# Patient Record
Sex: FEMALE | Race: WHITE | Employment: OTHER | URBAN - METROPOLITAN AREA
[De-identification: names, ages, dates, MRNs, and addresses within clinical notes are randomized per-mention and may not be internally consistent; named-entity substitution may affect disease eponyms.]

---

## 2017-02-06 RX ORDER — MYCOPHENOLATE MOFETIL 500 MG/1
TABLET ORAL
Qty: 180 TAB | Refills: 3 | Status: SHIPPED | OUTPATIENT
Start: 2017-02-06 | End: 2017-05-17 | Stop reason: SDUPTHER

## 2017-04-04 ENCOUNTER — TELEPHONE (OUTPATIENT)
Dept: RHEUMATOLOGY | Age: 47
End: 2017-04-04

## 2017-04-04 NOTE — TELEPHONE ENCOUNTER
Rell Ordoñez approved by Leticia HARPER- # [id number] good 3-1-1159 until 10-4-2017, 6 months. [facility], and gave approval number to Ondina Gifford, and she will call the patient to arrange delivery. I will My Chart the patient also.

## 2017-04-05 DIAGNOSIS — K74.3 PRIMARY BILIARY CHOLANGITIS (HCC): Primary | ICD-10-CM

## 2017-04-05 DIAGNOSIS — K75.4 AUTOIMMUNE HEPATITIS (HCC): ICD-10-CM

## 2017-04-06 LAB
ALBUMIN SERPL-MCNC: 4.6 G/DL (ref 3.5–5.5)
ALBUMIN/GLOB SERPL: 2.1 {RATIO} (ref 1.2–2.2)
ALP SERPL-CCNC: 43 IU/L (ref 39–117)
ALT SERPL-CCNC: 6 IU/L (ref 0–32)
AST SERPL-CCNC: 11 IU/L (ref 0–40)
BILIRUB SERPL-MCNC: <0.2 MG/DL (ref 0–1.2)
BUN SERPL-MCNC: 13 MG/DL (ref 6–24)
BUN/CREAT SERPL: 19 (ref 9–23)
CALCIUM SERPL-MCNC: 9.4 MG/DL (ref 8.7–10.2)
CHLORIDE SERPL-SCNC: 102 MMOL/L (ref 96–106)
CO2 SERPL-SCNC: 22 MMOL/L (ref 18–29)
CREAT SERPL-MCNC: 0.67 MG/DL (ref 0.57–1)
ERYTHROCYTE [DISTWIDTH] IN BLOOD BY AUTOMATED COUNT: 13 % (ref 12.3–15.4)
GLOBULIN SER CALC-MCNC: 2.2 G/DL (ref 1.5–4.5)
GLUCOSE SERPL-MCNC: 97 MG/DL (ref 65–99)
HCT VFR BLD AUTO: 40.1 % (ref 34–46.6)
HGB BLD-MCNC: 13.4 G/DL (ref 11.1–15.9)
MCH RBC QN AUTO: 31.2 PG (ref 26.6–33)
MCHC RBC AUTO-ENTMCNC: 33.4 G/DL (ref 31.5–35.7)
MCV RBC AUTO: 93 FL (ref 79–97)
PLATELET # BLD AUTO: 267 X10E3/UL (ref 150–379)
POTASSIUM SERPL-SCNC: 4.5 MMOL/L (ref 3.5–5.2)
PROT SERPL-MCNC: 6.8 G/DL (ref 6–8.5)
RBC # BLD AUTO: 4.3 X10E6/UL (ref 3.77–5.28)
SODIUM SERPL-SCNC: 141 MMOL/L (ref 134–144)
WBC # BLD AUTO: 6.9 X10E3/UL (ref 3.4–10.8)

## 2017-04-10 ENCOUNTER — TELEPHONE (OUTPATIENT)
Dept: RHEUMATOLOGY | Age: 47
End: 2017-04-10

## 2017-04-10 NOTE — TELEPHONE ENCOUNTER
Patient informed Manisha Distad has been approved by Cara Mason, from 4/4/2017-10/4/2017, PA# [id number]. Patient states she has received Manisha Distad. Has been on Manisha Distad for 2 years.

## 2017-05-18 RX ORDER — MYCOPHENOLATE MOFETIL 500 MG/1
TABLET ORAL
Qty: 310 TAB | Refills: 0 | Status: SHIPPED | OUTPATIENT
Start: 2017-05-18 | End: 2018-01-15 | Stop reason: SDUPTHER

## 2017-05-24 ENCOUNTER — OFFICE VISIT (OUTPATIENT)
Dept: INTERNAL MEDICINE CLINIC | Age: 47
End: 2017-05-24

## 2017-05-24 VITALS
SYSTOLIC BLOOD PRESSURE: 120 MMHG | OXYGEN SATURATION: 97 % | TEMPERATURE: 98.2 F | BODY MASS INDEX: 26.97 KG/M2 | DIASTOLIC BLOOD PRESSURE: 86 MMHG | WEIGHT: 167.8 LBS | RESPIRATION RATE: 18 BRPM | HEART RATE: 82 BPM | HEIGHT: 66 IN

## 2017-05-24 DIAGNOSIS — R19.7 DIARRHEA, UNSPECIFIED TYPE: ICD-10-CM

## 2017-05-24 DIAGNOSIS — R53.83 FATIGUE, UNSPECIFIED TYPE: ICD-10-CM

## 2017-05-24 DIAGNOSIS — K75.4 AUTOIMMUNE HEPATITIS (HCC): ICD-10-CM

## 2017-05-24 DIAGNOSIS — L40.50 PSORIATIC ARTHRITIS (HCC): ICD-10-CM

## 2017-05-24 DIAGNOSIS — Z13.220 SCREENING FOR HYPERLIPIDEMIA: ICD-10-CM

## 2017-05-24 DIAGNOSIS — K74.3 PRIMARY BILIARY CHOLANGITIS (HCC): ICD-10-CM

## 2017-05-24 DIAGNOSIS — R14.0 ABDOMINAL BLOATING: Primary | ICD-10-CM

## 2017-05-24 DIAGNOSIS — E55.9 VITAMIN D DEFICIENCY: ICD-10-CM

## 2017-05-24 NOTE — MR AVS SNAPSHOT
Visit Information Date & Time Provider Department Dept. Phone Encounter #  
 5/24/2017  9:30 AM Heather Perez, [address] [place] [address],[address] [phone] [id number] Follow-up Instructions Return for follow up pending labs and annual.  .  
  
Your Appointments 5/25/2017  8:40 AM  
ESTABLISHED PATIENT with Silvestre Garg MD  
Arthritis and [address] ([facility]) Appt Note: routine f/u  
 [address] [facility] [address] [phone]  
  
   
 [address] [facility] [address] Upcoming Health Maintenance Date Due Pneumococcal 19-64 Medium Risk (1 of 1 - PPSV23) 7/23/1990 DTaP/Tdap/Td series (1 - Tdap) 7/23/1992 PAP AKA CERVICAL CYTOLOGY 10/1/2016 INFLUENZA AGE 9 TO ADULT 8/1/2017 Allergies as of 5/24/2017  Review Complete On: 5/24/2017 By: Heather Perez MD  
  
 Severity Noted Reaction Type Reactions Ciprofloxacin  12/22/2009    Itching Codeine  12/22/2009    Itching Demerol [Meperidine]  12/22/2009    Itching Sulfa (Sulfonamide Antibiotics)  12/22/2009    Itching Ultram [Tramadol]  03/07/2011    Itching Vicodin [Hydrocodone-acetaminophen]  12/22/2009    Itching Current Immunizations  Never Reviewed Name Date MMR Vaccine 3/10/2011 11:00 AM  
  
 Not reviewed this visit You Were Diagnosed With   
  
 Codes Comments Abdominal bloating    -  Primary ICD-10-CM: R14.0 ICD-9-CM: 787.3 Psoriatic arthritis ([facility]     ICD-10-CM: L40.50 ICD-9-CM: 696.0 Primary biliary cholangitis (HCC)     ICD-10-CM: K74.3 ICD-9-CM: 571.6 Autoimmune hepatitis ([facility]     ICD-10-CM: K75.4 ICD-9-CM: 571.42 Fatigue, unspecified type     ICD-10-CM: R53.83 ICD-9-CM: 780.79 Screening for hyperlipidemia     ICD-10-CM: Z13.220 ICD-9-CM: V77.91 Vitamin D deficiency     ICD-10-CM: E55.9 ICD-9-CM: 268.9 Diarrhea, unspecified type     ICD-10-CM: R19.7 ICD-9-CM: 787.91 Vitals BP Pulse Temp Resp Height(growth percentile) Weight(growth percentile) 120/86 82 98.2 °F (36.8 °C) (Oral) 18 5' 6\" (1.676 m) 167 lb 12.8 oz (76.1 kg) SpO2 BMI OB Status Smoking Status 97% 27.08 kg/m2 Injection Never Smoker Vitals History BMI and BSA Data Body Mass Index Body Surface Area 27.08 kg/m 2 1.88 m 2 Preferred Pharmacy Pharmacy Name Phone [id number] Tenzin HUGO 69 [phone] Your Updated Medication List  
  
   
This list is accurate as of: 5/24/17 10:22 AM.  Always use your most recent med list.  
  
  
  
  
 apremilast 30 mg Tab Commonly known as:  Lodema White Take 30 mg by mouth two (2) times a day. Cholecalciferol (Vitamin D3) 3,000 unit Tab Take 4,000 Units by mouth daily. magnesium oxide 500 mg Tab Take  by mouth.  
  
 medroxyPROGESTERone 150 mg/mL injection Commonly known as:  DEPO-PROVERA  
  
 mycophenolate 500 mg tablet Commonly known as:  CELLCEPT  
TAKE 1 TABLET BY MOUTH TWICE A DAY Omega-3-DHA-EPA-Fish Oil 1,000 mg (120 mg-180 mg) Cap Take  by mouth. ursodiol 300 mg capsule Commonly known as:  ACTIGALL TAKE 3 CAPSULES BY MOUTH DAILY  
  
 WOMEN'S MULTI PO Take  by mouth. Takes one po daily. We Performed the Following CELIAC ANTIBODY PROFILE [NLW30717 Custom] LIPID PANEL [92302 CPT(R)] REFERRAL TO LIVER HEPATOLOGY [[id number] Custom] Comments:  
 Please evaluate patient for followup on PBC and autoimmune hepatitis. REFERRAL TO RHEUMATOLOGY [SYZ61 Custom] Comments:  
 Please evaluate patient for followup on psoriatic arthritis T4, FREE [id number] CPT(R)] TSH 3RD GENERATION [89669 CPT(R)] VITAMIN D, 25 HYDROXY [id number] CPT(R)] Follow-up Instructions Return for follow up pending labs and annual.  . Referral Information Referral ID Referred By Referred To [id number] [address] [place] [address] Ruchi Grijalva MD   
   [address] [facility], [address] [place] [address] Phone: [phone] Fax: [phone] Visits Status Start Date End Date 1 New Request 5/24/17 5/24/18 If your referral has a status of pending review or denied, additional information will be sent to support the outcome of this decision. Referral ID Referred By Referred To  
 [id number] Flaco Cantu MD  
   [address] [place] [address] [address] [place], [facility] Phone: [phone] Fax: [phone] Visits Status Start Date End Date 1 New Request 5/24/17 5/24/18 If your referral has a status of pending review or denied, additional information will be sent to support the outcome of this decision. Patient Instructions FODMAPS diet. Reduce uncooked veggies, fats and dairy. Keep a food diary. Simethicone, Gas-X for bloating. Introducing [facility]! Dear Dory Santizo: Thank you for requesting a [employer] account. Our records indicate that you already have an active [employer] account. You can access your account anytime at [email or web address] Did you know that you can access your hospital and ER discharge instructions at any time in [employer]? You can also review all of your test results from your hospital stay or ER visit. Additional Information If you have questions, please visit the Frequently Asked Questions section of the [employer] website at [email or web address] Remember, [employer] is NOT to be used for urgent needs. For medical emergencies, dial 911. Now available from your iPhone and Android! Please provide this summary of care documentation to your next provider. Your primary care clinician is listed as Evangelina Pak. If you have any questions after today's visit, please call [phone].

## 2017-05-24 NOTE — PROGRESS NOTES
Chief Complaint   Patient presents with   [address]    Referral Request     Referral to Rheumatology     GI Problem     c/o Abdominal Pain x 2 weeks     1. Have you been to the ER, urgent care clinic since your last visit? Hospitalized since your last visit? No    2. Have you seen or consulted any other health care providers outside of the [address] [place] [address] since your last visit? Include any pap smears or colon screening.  Yes Where: Dr. Ting Del Toro, Rheumatology

## 2017-05-24 NOTE — LETTER
6/7/2017 10:02 AM 
 
Ms. Peewee Hurtado [address] [facility] [place] [address] Dear Peewee Hurtado: 
 
Please find your most recent results below. Resulted Orders LIPID PANEL Result Value Ref Range Cholesterol, total 210 (H) 100 - 199 mg/dL Comment:  
   Specimen received hemolyzed. Clinical correlation indicated. Triglyceride 146 0 - 149 mg/dL HDL Cholesterol 53 >39 mg/dL VLDL, calculated 29 5 - 40 mg/dL LDL, calculated 128 (H) 0 - 99 mg/dL Narrative Performed at:  [address]  [id number] : Daren Villa MD, Phone:  [phone] VITAMIN D, 25 HYDROXY Result Value Ref Range VITAMIN D, 25-HYDROXY 31.2 30.0 - 100.0 ng/mL Comment:  
   Vitamin D deficiency has been defined by the [address] [place] [address] practice guideline as a 
level of serum 25-OH vitamin D less than 20 ng/mL (1,2). The Endocrine Society went on to further define vitamin D 
insufficiency as a level between 21 and 29 ng/mL (2). 1. IOM ([place] of Medicine). 2010. Dietary reference 
   intakes for calcium and D. [facility]: The 
   [employer]. 2. Patience MF, Luis Fernando [place], Meño HURTADO, et al. 
   Evaluation, treatment, and prevention of vitamin D 
   deficiency: an Endocrine Society clinical practice 
   guideline. JCEM. 2011 Jul; 96(7):1911-30. Narrative Performed at:  [address]  [id number] : Daren Villa MD, Phone:  [phone] TSH 3RD GENERATION Result Value Ref Range TSH 1.860 0.450 - 4.500 uIU/mL Narrative Performed at:  [address]  [id number] : Daren Villa MD, Phone:  [phone] T4, FREE Result Value Ref Range T4, Free 1.27 0.82 - 1.77 ng/dL Narrative Performed at:  [place] [address] [address]  [id number] : Analy Alcala MD, Phone:  [phone] CELIAC ANTIBODY PROFILE Result Value Ref Range Immunoglobulin A, Qt. 177 87 - 352 mg/dL Deamidated Gliadin Ab, IgA 3 0 - 19 units Comment:  
                      Negative                   0 - 19 Weak Positive             20 - 30 Moderate to Strong Positive   >30 Deamidated Gliadin Ab, IgG 2 0 - 19 units Comment:  
                      Negative                   0 - 19 Weak Positive             20 - 30 Moderate to Strong Positive   >30 
  
 t-Transglutaminase, IgA <2 0 - 3 U/mL Comment:  
                                 Negative        0 -  3 Weak Positive   4 - 10 Positive           >10 Tissue Transglutaminase (tTG) has been identified 
 as the endomysial antigen. Studies have demonstr- 
 ated that endomysial IgA antibodies have over 99% 
 specificity for gluten sensitive enteropathy. t-Transglutaminase, IgG <2 0 - 5 U/mL Comment:  
                                 Negative        0 - 5 Weak Positive   6 - 9 Positive           >9 Narrative Performed at:  [address]  [id number] : Analy Alcala MD, Phone:  [phone] RECOMMENDATIONS: Negative test for celiac disease. Vitamin d normal. Cholesterol mildly elevated. Normal thyroid. Annual followup and as needed. Please call me if you have any questions: [phone] Sincerely, 
 
 
Valeriy Castillo MD

## 2017-05-24 NOTE — PATIENT INSTRUCTIONS
FODMAPS diet. Reduce uncooked veggies, fats and dairy. Keep a food diary. Simethicone, Gas-X for bloating.

## 2017-05-24 NOTE — PROGRESS NOTES
HPI: Kj Rehman is a 40 y.o. female presents to establish. She has a history of primary biliary cirrhosis, autoimmune hepatitis and psoriatic arthritis. Sees  [facility]. Saw Dr. Chanel Hudson, hepatology. Reports weight gain 10#,  Under more stress with work. Did not change eating habits with stress. Was walking regularly for 6 weeks and reduced due to work schedule. Daughter in , supportive partner. Denies anxious and depression. Feeling fatigued. normal CBC and COMP in April. Not sleeping as well as used to. Dog/kid waking her up, or to go to the bathroom. Diet is healthy. Reports abdominal bloating and discomfort in lower mid abdomen, 2 weeks. Diet high in veggies. Reports diarrhea since on Otezla for 2 years. No change in BM. Prior georgette. Sees gyn, prior abnormal pap. On depoprovera. No menses. Prior DEXA normal, 2 years ago. ROS:  Constitutional: negative for fevers, chills, anorexia, weight loss, positive for weight gain  Eyes:   negative for visual disturbance, irritation  ENT:   negative for tinnitus,sore throat,nasal congestion,ear pain,  Respiratory:  negative for cough, hemoptysis, dyspnea,wheezing  CV:   negative for chest pain, palpitations, lower extremity edema  GI:   negative for nausea, vomiting,  abdominal pain,melena,positive for diarrhea, abdominal bloating.    Endo:               negative for polyuria,polydipsia,polyphagia,heat intolerance  Genitourinary: negative for frequency, dysuria, hematuria  Integument:  negative for rash, pruritus  Hematologic:  negative for easy bruising and gum/nose bleeding  Musculoskel: negative for myalgias, back pain, muscle weakness, joint pain  Neurological:  negative for headaches, dizziness, gait problems, numbness  Behavl/Psych: negative for feelings of anxiety, depression, mood changes    Past Medical History:   Diagnosis Date    Abnormal Pap smear     h/o cryo    Autoimmune [facility] ([facility] 6/10/2012    Cholestasis of pregnancy     Elevated LFTs     Herpes simplex without mention of complication     not on Valtrex now (4/26/2012)    PBC (primary biliary cirrhosis)     [facility] ([facility]     Scalp psoriasis     Sicca syndrome, Sjogren's ([facility]      Past Surgical History:   Procedure Laterality Date    HX CHOLECYSTECTOMY      age 20's   [de-identified] OTHER SURGICAL      cholecystectomy     Social History     Social History    Marital status:      Spouse name: N/A    Number of children: 1    Years of education: N/A     Occupational History    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     Social History Main Topics    Smoking status: Never Smoker    Smokeless tobacco: Never Used    Alcohol use 1.8 oz/week     3 Glasses of wine per week    Drug use: No    Sexual activity: Yes     Partners: Male     Birth control/ protection: Injection     Other Topics Concern    None     Social History Narrative     Family History   Problem Relation Age of Onset    Thyroid Disease Father 54     hypothyroidism    High Cholesterol Mother     Cancer Paternal Grandmother      brca, age 63's    Heart Disease Maternal Grandmother     No Known Problems Brother     Suicide Maternal Grandfather     Heart Disease Brother      age 64    Cancer Paternal Aunt      pancreatic cancer     Current Outpatient Prescriptions   Medication Sig Dispense Refill    mycophenolate (CELLCEPT) 500 mg tablet TAKE 1 TABLET BY MOUTH TWICE A  Tab 0    apremilast (OTEZLA) 30 mg tab Take 30 mg by mouth two (2) times a day. 60 Tab 3    ursodiol (ACTIGALL) 300 mg capsule TAKE 3 CAPSULES BY MOUTH DAILY 90 Cap 5    medroxyPROGESTERone (DEPO-PROVERA) 150 mg/mL injection       magnesium oxide 500 mg tab Take  by mouth.  Omega-3-DHA-EPA-Fish Oil 1,000 (120-180) mg Cap Take  by mouth.  Cholecalciferol, Vitamin D3, 3,000 unit Tab Take 4,000 Units by mouth daily.       MV-MN/IRON FUM/FA/OMEGA3,6,9#3 [facility] MULTI PO) Take  by mouth. Takes one po daily. Allergies   Allergen Reactions    Ciprofloxacin Itching    Codeine Itching    Demerol [Meperidine] Itching    Sulfa (Sulfonamide Antibiotics) Itching    Ultram [Tramadol] Itching    Vicodin [Hydrocodone-Acetaminophen] Itching         Physical exam:  Visit Vitals    /86    Pulse 82    Temp 98.2 °F (36.8 °C) (Oral)    Resp 18    Ht 5' 6\" (1.676 m)    Wt 167 lb 12.8 oz (76.1 kg)    SpO2 97%    BMI 27.08 kg/m2     General appearance - alert, well appearing, and in no distress  HEENT- PERLL,normal conjunctiva, TM normal bilaterally,mucous membranes moist, pharynx normal without lesions  Neck - supple, no significant adenopathy   Pulm- clear to auscultation, no wheezes, rales or rhonchi  CV- normal rate, regular rhythm, normal S1, S2, no murmurs   Abdomen - soft, nontender, nondistended, no masses or organomegaly  Extrem-peripheral pulses normal, no pedal edema, no joint synovitis or deformity  Neuro -alert, oriented,nonfocal    Assessment/Plan:    1. Psoriatic arthritis ([facility]    - REFERRAL TO RHEUMATOLOGY    2. Primary biliary cholangitis ([facility]    - REFERRAL TO LIVER HEPATOLOGY    3. Autoimmune hepatitis ([facility]    - REFERRAL TO LIVER HEPATOLOGY    4. Fatigue, unspecified type- given information on fatigue. Checking baseline labs. Recommend regular exercise and adequate rest      - TSH 3RD GENERATION  - T4, FREE    5. Screening for hyperlipidemia    - LIPID PANEL    6. Vitamin D deficiency    - VITAMIN D, 25 HYDROXY    7. Abdominal bloating-given FODMAPS diet information and discussed dietary changes. Use simethicone prn.     - CELIAC ANTIBODY PROFILE    8.  Diarrhea, unspecified type-    - CELIAC ANTIBODY PROFILE    Follow-up Disposition:  Return for follow up pending labs and annual.  .    Roseann Crawford MD

## 2017-05-25 ENCOUNTER — OFFICE VISIT (OUTPATIENT)
Dept: RHEUMATOLOGY | Age: 47
End: 2017-05-25

## 2017-05-25 VITALS
WEIGHT: 167.4 LBS | HEART RATE: 76 BPM | BODY MASS INDEX: 26.9 KG/M2 | DIASTOLIC BLOOD PRESSURE: 88 MMHG | OXYGEN SATURATION: 99 % | TEMPERATURE: 99.1 F | SYSTOLIC BLOOD PRESSURE: 124 MMHG | RESPIRATION RATE: 18 BRPM | HEIGHT: 66 IN

## 2017-05-25 DIAGNOSIS — L40.50 PSA (PSORIATIC ARTHRITIS) (HCC): Primary | ICD-10-CM

## 2017-05-25 NOTE — MR AVS SNAPSHOT
Visit Information Date & Time Provider Department Dept. Phone Encounter #  
 5/25/2017  8:40 AM Ghazala Nayak MD [facility] Pending sale to [facility] [id number] Follow-up Instructions Return in about 4 months (around 9/25/2017). Upcoming Health Maintenance Date Due Pneumococcal 19-64 Medium Risk (1 of 1 - PPSV23) 7/23/1990 DTaP/Tdap/Td series (1 - Tdap) 7/23/1992 PAP AKA CERVICAL CYTOLOGY 10/1/2016 INFLUENZA AGE 9 TO ADULT 8/1/2017 Allergies as of 5/25/2017  Review Complete On: 5/25/2017 By: Chirag Castellanos LPN Severity Noted Reaction Type Reactions Ciprofloxacin  12/22/2009    Itching Codeine  12/22/2009    Itching Demerol [Meperidine]  12/22/2009    Itching Sulfa (Sulfonamide Antibiotics)  12/22/2009    Itching Ultram [Tramadol]  03/07/2011    Itching Vicodin [Hydrocodone-acetaminophen]  12/22/2009    Itching Current Immunizations  Never Reviewed Name Date MMR Vaccine 3/10/2011 11:00 AM  
  
 Not reviewed this visit Vitals BP Pulse Temp Resp Height(growth percentile) Weight(growth percentile) 124/88 (BP 1 Location: Right arm, BP Patient Position: Sitting) 76 99.1 °F (37.3 °C) (Oral) 18 5' 6\" (1.676 m) 167 lb 6.4 oz (75.9 kg) SpO2 BMI OB Status Smoking Status 99% 27.02 kg/m2 Injection Never Smoker BMI and BSA Data Body Mass Index Body Surface Area  
 27.02 kg/m 2 1.88 m 2 Preferred Pharmacy Pharmacy Name Phone [id number] [phone] Your Updated Medication List  
  
   
This list is accurate as of: 5/25/17  9:21 AM.  Always use your most recent med list.  
  
  
  
  
 apremilast 30 mg Tab Commonly known as:  Fay Boys Take 30 mg by mouth two (2) times a day. medroxyPROGESTERone 150 mg/mL injection Commonly known as:  DEPO-PROVERA mycophenolate 500 mg tablet Commonly known as:  CELLCEPT  
TAKE 1 TABLET BY MOUTH TWICE A DAY  
  
 ursodiol 300 mg capsule Commonly known as:  ACTIGALL TAKE 3 CAPSULES BY MOUTH DAILY Follow-up Instructions Return in about 4 months (around 9/25/2017). Introducing [facility]! Dear Pema Florez: Thank you for requesting a Glio account. Our records indicate that you already have an active Glio account. You can access your account anytime at [email or web address] Did you know that you can access your hospital and ER discharge instructions at any time in Glio? You can also review all of your test results from your hospital stay or ER visit. Additional Information If you have questions, please visit the Frequently Asked Questions section of the Glio website at [email or web address] Remember, Glio is NOT to be used for urgent needs. For medical emergencies, dial 911. Now available from your iPhone and Android! Please provide this summary of care documentation to your next provider. Your primary care clinician is listed as Sav Griffith. If you have any questions after today's visit, please call [phone].

## 2017-05-25 NOTE — PROGRESS NOTES
RHEUMATOLOGY PROBLEM LIST AND CHIEF COMPLAINT  1. Psoriatic arthritis - psoriasis, inflammatory arthralgia, fatigue, morning stiffness. Therapy History:  Prior DMARDs: Plaquenil (05/2016-11/2016)  Current DMARDs: Dallas Bracey (02/2015-current)    2. Sicca syndrome - paresthesia, dry mouth  3. Autoimmune hepatitis - previous prednisone for one year, now on CellCept  4. Primary biliary cirrhosis    INTERVAL HISTORY  This is a 40 y.o.  female. Today, the patient complains of no pain in the joints. Location: hand, hip, ankle, foot  Severity:  0 on a scale of 0-10  Timing: all day   Duration:  1 year  Modifying factors: Otezla  Context/Associated signs and symptoms: The patient has not been seen in almost 1 year. Today, she states that she is doing well. She admits to stopping after about 6 months because she did not notice any improvement. She complains that she is still having stomach issues from Dallas Chicho and that a zinc supplement did not help this. She states that she started a probiotic last week and thinks that this may be helping, but she does not want to stop Dallas Bracey because it has been helping with her PsA. She denies any psoriasis patches, but thinks that she may have be developing a rash on her scalp. She denies any arthralgia today, but states that some days she has pain and morning stiffness in the fingers, toes, ankles, and hips. She continues on Otezla 30 mg BID and Cellcept for her autoimmune hepatitis.      RHEUMATOLOGY REVIEW OF SYSTEMS   Positives as per history  Negatives as follows:  Michael Early:  Denies unexplained persistent fevers or weight change  RESPIRATORY:  No pleuritic pain, exertional dyspnea  CARDIOVASCULAR:  Denies chest pain  GASTRO:   Denies heartburn, abdominal pain, nausea, vomiting, diarrhea  SKIN:    Denies rash   MSK:    No morning stiffness >1 hour, persistent joint swelling    PAST MEDICAL HISTORY  Reviewed with patient, significant changes in medical history - no    PHYSICAL EXAM  Blood pressure 124/88, pulse 76, temperature 99.1 °F (37.3 °C), temperature source Oral, resp. rate 18, height 5' 6\" (1.676 m), weight 167 lb 6.4 oz (75.9 kg), SpO2 99 %. GENERAL APPEARANCE: Well-nourished, no acute distress  NECK: No adenopathy  ENT: No oral ulcers  CARDIOVASCULAR: Heart rhythm is regular. No murmur, rub, gallop  CHEST: Normal vesicular breath sounds. No wheezes, rales, pleural friction rubs  ABDOMINAL: The abdomen is soft and nontender. Bowel sounds are normal  SKIN: no psoriasis noted  MUSCULOSKELETAL: no SI joint pain. Negative Doron's bilaterally. Improvement in arthralgias with no tenderness over joints today  Upper extremities - full range of motion, no tenderness, no swelling, no synovial thickening and no deformity of joints  Lower extremities - full range of motion, no tenderness, no swelling, no synovial thickening and no deformity of joints     LABS, RADIOLOGY AND PROCEDURES   Previous labs reviewed -Yes    ASSESSMENT  1. Psoriatic arthritis (Established problem -  Stable disease) - the patient stopped plaquenil and has continued on Otezla 30 mg BID. Her disease appears stable, but she complained that she has occasional pain and morning stiffness. She also continues to have GI upset from her Inga Mustache, but she does not want to stop this since it helps with her psoriasis and joint symptoms. I discussed switching her to a biologic at some point in the future if her joint symptoms worsen on Otezla or if her GI symptoms become unbearable. If she decides that she wants to switch her medication, I will speak with Dr. Jeana Olvera prior to starting her on a new medication. She should continue on Otezla 30 mg BID and Cellcept as directed by hepatology. She should return in 4 months for a follow up. 2. Sicca syndrome - symptomatic treatment for dryness recommended. 3. Drug therapy monitoring for toxicity (cellcept) - CBC, BUN, Cr, AST, ALT and albumin every 3 months  4.  Drug therapy monitoring for toxicity (plaquenil ) - CBC, BUN, Cr, AST, ALT and albumin every 3 months; eye exams every 6-12 months for retinal toxicity. PLAN  1. Otezla 30 mg BID   2. Symptomatic treatment for sicca syndrome  3. CellCept for autoimmune hepatitis  4. Return in 4 months     Steven Rodrigues MD  Adult and Pediatric Rheumatology     [facility], [address] [facility], Phone [phone], Fax [phone]     Visiting  of Pediatrics    Department of Pediatrics, [facility] of [address] [place] [address], [address] [place] [address], Phone [phone], Fax [phone]    There are no Patient Instructions on file for this visit. cc:  MD Carleen Housermega    Written by pantera Paige, as dictated by Hira Ortiz.  Andrew Rodrigues M.D.

## 2017-05-26 LAB
25(OH)D3+25(OH)D2 SERPL-MCNC: 31.2 NG/ML (ref 30–100)
CHOLEST SERPL-MCNC: 210 MG/DL (ref 100–199)
GLIADIN PEPTIDE IGA SER-ACNC: 3 UNITS (ref 0–19)
GLIADIN PEPTIDE IGG SER-ACNC: 2 UNITS (ref 0–19)
HDLC SERPL-MCNC: 53 MG/DL
IGA SERPL-MCNC: 177 MG/DL (ref 87–352)
LDLC SERPL CALC-MCNC: 128 MG/DL (ref 0–99)
T4 FREE SERPL-MCNC: 1.27 NG/DL (ref 0.82–1.77)
TRIGL SERPL-MCNC: 146 MG/DL (ref 0–149)
TSH SERPL DL<=0.005 MIU/L-ACNC: 1.86 UIU/ML (ref 0.45–4.5)
TTG IGA SER-ACNC: <2 U/ML (ref 0–3)
TTG IGG SER-ACNC: <2 U/ML (ref 0–5)
VLDLC SERPL CALC-MCNC: 29 MG/DL (ref 5–40)

## 2017-06-06 NOTE — PROGRESS NOTES
Notify patient negative test for celiac disease. Vitamin d normal. Cholesterol mildly elevated. Normal thyroid. Annual followup and as needed.

## 2017-09-06 RX ORDER — APREMILAST 30 MG/1
TABLET, FILM COATED ORAL
Qty: 60 TAB | Refills: 2 | Status: SHIPPED | OUTPATIENT
Start: 2017-09-06 | End: 2017-09-07 | Stop reason: SDUPTHER

## 2017-09-12 RX ORDER — APREMILAST 30 MG/1
TABLET, FILM COATED ORAL
Qty: 60 TAB | Refills: 2 | Status: SHIPPED | OUTPATIENT
Start: 2017-09-12 | End: 2017-09-12 | Stop reason: SDUPTHER

## 2017-09-25 ENCOUNTER — OFFICE VISIT (OUTPATIENT)
Dept: RHEUMATOLOGY | Age: 47
End: 2017-09-25

## 2017-09-25 VITALS
TEMPERATURE: 99.1 F | OXYGEN SATURATION: 97 % | HEART RATE: 85 BPM | WEIGHT: 166.4 LBS | BODY MASS INDEX: 26.74 KG/M2 | HEIGHT: 66 IN | DIASTOLIC BLOOD PRESSURE: 87 MMHG | SYSTOLIC BLOOD PRESSURE: 122 MMHG

## 2017-09-25 DIAGNOSIS — K75.4 AUTOIMMUNE HEPATITIS (HCC): Primary | ICD-10-CM

## 2017-09-25 DIAGNOSIS — L40.50 PSA (PSORIATIC ARTHRITIS) (HCC): ICD-10-CM

## 2017-09-25 NOTE — MR AVS SNAPSHOT
Visit Information Date & Time Provider Department Dept. Phone Encounter #  
 9/25/2017 10:30 AM Dolly Mcburney, MD [address] [phone] [id number] Upcoming Health Maintenance Date Due Pneumococcal 19-64 Medium Risk (1 of 1 - PPSV23) 7/23/1990 DTaP/Tdap/Td series (1 - Tdap) 7/23/1992 PAP AKA CERVICAL CYTOLOGY 10/1/2016 INFLUENZA AGE 9 TO ADULT 8/1/2017 Allergies as of 9/25/2017  Review Complete On: 9/25/2017 By: Dolly Mcburney, MD  
  
 Severity Noted Reaction Type Reactions Ciprofloxacin  12/22/2009    Itching Codeine  12/22/2009    Itching Demerol [Meperidine]  12/22/2009    Itching Sulfa (Sulfonamide Antibiotics)  12/22/2009    Itching Ultram [Tramadol]  03/07/2011    Itching Vicodin [Hydrocodone-acetaminophen]  12/22/2009    Itching Current Immunizations  Never Reviewed Name Date MMR Vaccine 3/10/2011 11:00 AM  
  
 Not reviewed this visit You Were Diagnosed With   
  
 Codes Comments Autoimmune hepatitis ([facility]    -  Primary ICD-10-CM: K75.4 ICD-9-CM: 571.42   
 PSA (psoriatic arthritis) ([facility]     ICD-10-CM: L40.50 ICD-9-CM: 696.0 Vitals BP Pulse Temp Height(growth percentile) Weight(growth percentile) SpO2  
 122/87 (BP 1 Location: Left arm, BP Patient Position: Sitting) 85 99.1 °F (37.3 °C) (Oral) 5' 6\" (1.676 m) 166 lb 6.4 oz (75.5 kg) 97% BMI OB Status Smoking Status 26.86 kg/m2 Injection Never Smoker Vitals History BMI and BSA Data Body Mass Index Body Surface Area  
 26.86 kg/m 2 1.88 m 2 Preferred Pharmacy Pharmacy Name Phone [id number] [address] [address] [phone] Your Updated Medication List  
  
   
This list is accurate as of: 9/25/17 11:24 AM.  Always use your most recent med list.  
  
  
  
  
 apremilast 30 mg Tab Commonly known as:  David Lighter TAKE 30MG BY MOUTH TWO (2) TIMES A DAY  
  
 medroxyPROGESTERone 150 mg/mL injection Commonly known as:  DEPO-PROVERA  
  
 mycophenolate 500 mg tablet Commonly known as:  CELLCEPT  
TAKE 1 TABLET BY MOUTH TWICE A DAY  
  
 ursodiol 300 mg capsule Commonly known as:  ACTIGALL TAKE 3 CAPSULES BY MOUTH EVERY DAY We Performed the Following CBC+PLATELET+HEM REVIEW [18711 CPT(R)] HEP B SURFACE AG [id number] CPT(R)] HEPATITIS B CORE AB, TOTAL [id number] CPT(R)] HEPATITIS B SURF AB QUANT [id number] CPT(R)] METABOLIC PANEL, COMPREHENSIVE [35409 CPT(R)] QUANTIFERON TB GOLD [[id number] Custom] Introducing [facility] SERVICES! Dear Cheryl Son: Thank you for requesting a [email or web address] account. Our records indicate that you already have an active [email or web address] account. You can access your account anytime at [email or web address] Did you know that you can access your hospital and ER discharge instructions at any time in [email or web address]? You can also review all of your test results from your hospital stay or ER visit. Additional Information If you have questions, please visit the Frequently Asked Questions section of the [email or web address] website at [email or web address] Remember, [email or web address] is NOT to be used for urgent needs. For medical emergencies, dial 911. Now available from your iPhone and Android! Please provide this summary of care documentation to your next provider. Your primary care clinician is listed as Preeti Mcnally. If you have any questions after today's visit, please call [phone].

## 2017-09-25 NOTE — PROGRESS NOTES
Identified pt with two pt identifiers(name and ). Reviewed record in preparation for visit and have obtained necessary documentation. Chief Complaint   Patient presents with    Other     PSA        Health Maintenance Due   Topic    Pneumococcal 19-64 Medium Risk (1 of 1 - PPSV23)    DTaP/Tdap/Td series (1 - Tdap)    PAP AKA CERVICAL CYTOLOGY     INFLUENZA AGE 9 TO ADULT        Coordination of Care Questionnaire:  :   1) Have you been to an emergency room, urgent care clinic since your last visit? no   Hospitalized since your last visit? no             2. Have seen or consulted any other health care provider since your last visit? NO  If yes, where when, and reason for visit? 3) Do you have an Advanced Directive/ Living Will in place? YES  If yes, do we have a copy on file No, patient stated she will bring a copy on her next visit      Patient is accompanied by self.

## 2017-09-27 LAB
ALBUMIN SERPL-MCNC: 4.7 G/DL (ref 3.5–5.5)
ALBUMIN/GLOB SERPL: 2 {RATIO} (ref 1.2–2.2)
ALP SERPL-CCNC: 50 IU/L (ref 39–117)
ALT SERPL-CCNC: 10 IU/L (ref 0–32)
AST SERPL-CCNC: 12 IU/L (ref 0–40)
BASOPHILS # BLD MANUAL: 0.1 X10E3/UL (ref 0–0.2)
BASOPHILS NFR BLD MANUAL: 1 %
BILIRUB SERPL-MCNC: 0.4 MG/DL (ref 0–1.2)
BUN SERPL-MCNC: 14 MG/DL (ref 6–24)
BUN/CREAT SERPL: 21 (ref 9–23)
CALCIUM SERPL-MCNC: 9.4 MG/DL (ref 8.7–10.2)
CHLORIDE SERPL-SCNC: 102 MMOL/L (ref 96–106)
CO2 SERPL-SCNC: 23 MMOL/L (ref 18–29)
CREAT SERPL-MCNC: 0.66 MG/DL (ref 0.57–1)
DIFFERENTIAL COMMENT, 115260: NORMAL
EOSINOPHIL # BLD MANUAL: 0.1 X10E3/UL (ref 0–0.4)
EOSINOPHIL NFR BLD MANUAL: 1 %
ERYTHROCYTE [DISTWIDTH] IN BLOOD BY AUTOMATED COUNT: 13 % (ref 12.3–15.4)
GLOBULIN SER CALC-MCNC: 2.4 G/DL (ref 1.5–4.5)
GLUCOSE SERPL-MCNC: 91 MG/DL (ref 65–99)
HBV CORE AB SERPL QL IA: NEGATIVE
HBV SURFACE AB SER-ACNC: <3.1 MIU/ML
HBV SURFACE AG SERPL QL IA: NEGATIVE
HCT VFR BLD AUTO: 44 % (ref 34–46.6)
HGB BLD-MCNC: 14.4 G/DL (ref 11.1–15.9)
LYMPHOCYTES # BLD MANUAL: 2.6 X10E3/UL (ref 0.7–3.1)
LYMPHOCYTES NFR BLD MANUAL: 37 %
MCH RBC QN AUTO: 31.4 PG (ref 26.6–33)
MCHC RBC AUTO-ENTMCNC: 32.7 G/DL (ref 31.5–35.7)
MCV RBC AUTO: 96 FL (ref 79–97)
MONOCYTES # BLD MANUAL: 0.5 X10E3/UL (ref 0.1–0.9)
MONOCYTES NFR BLD MANUAL: 7 %
NEUTROPHILS # BLD MANUAL: 3.7 X10E3/UL (ref 1.4–7)
NEUTROPHILS NFR BLD MANUAL: 54 %
PLATELET # BLD AUTO: 309 X10E3/UL (ref 150–379)
PLATELET BLD QL SMEAR: ADEQUATE
POTASSIUM SERPL-SCNC: 4.1 MMOL/L (ref 3.5–5.2)
PROT SERPL-MCNC: 7.1 G/DL (ref 6–8.5)
RBC # BLD AUTO: 4.58 X10E6/UL (ref 3.77–5.28)
RBC MORPH BLD: NORMAL
SODIUM SERPL-SCNC: 138 MMOL/L (ref 134–144)
WBC # BLD AUTO: 6.9 X10E3/UL (ref 3.4–10.8)

## 2017-09-29 LAB
ANNOTATION COMMENT IMP: NORMAL
GAMMA INTERFERON BACKGROUND BLD IA-ACNC: 0.02 IU/ML
M TB IFN-G BLD-IMP: NEGATIVE
M TB IFN-G CD4+ BCKGRND COR BLD-ACNC: 0 IU/ML
M TB IFN-G CD4+ T-CELLS BLD-ACNC: 0.02 IU/ML
MITOGEN IGNF BLD-ACNC: >10 IU/ML
QUANTIFERON INCUBATION: NORMAL
SERVICE CMNT-IMP: NORMAL

## 2017-10-12 ENCOUNTER — TELEPHONE (OUTPATIENT)
Dept: INTERNAL MEDICINE CLINIC | Age: 47
End: 2017-10-12

## 2017-10-12 ENCOUNTER — TELEPHONE (OUTPATIENT)
Dept: HEMATOLOGY | Age: 47
End: 2017-10-12

## 2017-10-12 NOTE — TELEPHONE ENCOUNTER
Dr. Marcia Gilliam would like a return phone call regarding patient Jony Lara.   He would like to discuss a particular medication he would like to prescribe her for treatment but wants to check with you first.

## 2017-10-12 NOTE — TELEPHONE ENCOUNTER
#[phone]  Pt states she has had vertigo for over a week and would like to see Dr Koko Hinton.   Please call pt

## 2017-10-12 NOTE — TELEPHONE ENCOUNTER
Called and spoke to pt. Two pt identifiers confirmed. Scheduled pt for 10/13/17 at 0915 with Dr. Antony Bonilla. No further questions at time of call.

## 2017-10-13 ENCOUNTER — OFFICE VISIT (OUTPATIENT)
Dept: INTERNAL MEDICINE CLINIC | Age: 47
End: 2017-10-13

## 2017-10-13 VITALS
HEIGHT: 66 IN | OXYGEN SATURATION: 99 % | SYSTOLIC BLOOD PRESSURE: 105 MMHG | BODY MASS INDEX: 27.03 KG/M2 | DIASTOLIC BLOOD PRESSURE: 72 MMHG | TEMPERATURE: 98.2 F | HEART RATE: 80 BPM | WEIGHT: 168.2 LBS | RESPIRATION RATE: 18 BRPM

## 2017-10-13 DIAGNOSIS — H81.90 VESTIBULAR DIZZINESS: Primary | ICD-10-CM

## 2017-10-13 RX ORDER — MECLIZINE HYDROCHLORIDE 25 MG/1
TABLET ORAL
Qty: 30 TAB | Refills: 0 | Status: SHIPPED | OUTPATIENT
Start: 2017-10-13 | End: 2017-11-30 | Stop reason: ALTCHOICE

## 2017-10-13 NOTE — MR AVS SNAPSHOT
Visit Information Date & Time Provider Department Dept. Phone Encounter #  
 10/13/2017 11:15 AM Renetta Philippe, [address] [id number] Follow-up Instructions Return if symptoms worsen or fail to improve. Your Appointments 1/30/2018 10:00 AM  
ESTABLISHED PATIENT with Aylin Herr MD  
[address] ([facility]) Appt Note: 4 mo fu  
 [address] [facility] [address]  
[phone]  
  
   
 [address] [phone] Upcoming Health Maintenance Date Due Pneumococcal 19-64 Medium Risk (1 of 1 - PPSV23) 7/23/1990 DTaP/Tdap/Td series (1 - Tdap) 7/23/1992 PAP AKA CERVICAL CYTOLOGY 10/1/2016 INFLUENZA AGE 9 TO ADULT 8/1/2017 Allergies as of 10/13/2017  Review Complete On: 10/13/2017 By: Eli Pisano LPN Severity Noted Reaction Type Reactions Ciprofloxacin  12/22/2009    Itching Codeine  12/22/2009    Itching Demerol [Meperidine]  12/22/2009    Itching Sulfa (Sulfonamide Antibiotics)  12/22/2009    Itching Ultram [Tramadol]  03/07/2011    Itching Vicodin [Hydrocodone-acetaminophen]  12/22/2009    Itching Current Immunizations  Never Reviewed Name Date MMR Vaccine 3/10/2011 11:00 AM  
  
 Not reviewed this visit You Were Diagnosed With   
  
 Codes Comments Vestibular dizziness    -  Primary ICD-10-CM: K01 ICD-9-CM: 386. 9 Vitals BP Pulse Temp Resp Height(growth percentile) Weight(growth percentile) 105/72 (BP 1 Location: Left arm, BP Patient Position: Sitting) 80 98.2 °F (36.8 °C) (Oral) 18 5' 6\" (1.676 m) 168 lb 3.2 oz (76.3 kg) SpO2 BMI OB Status Smoking Status 99% 27.15 kg/m2 Injection Never Smoker BMI and BSA Data Body Mass Index Body Surface Area  
 27.15 kg/m 2 1.88 m 2 Preferred Pharmacy Pharmacy Name Phone [id number] - [address] [phone] Your Updated Medication List  
  
   
This list is accurate as of: 10/13/17 12:26 PM.  Always use your most recent med list.  
  
  
  
  
 apremilast 30 mg Tab Commonly known as:  Casselberry Ilsa TAKE 30MG BY MOUTH TWO (2) TIMES A DAY  
  
 meclizine 25 mg tablet Commonly known as:  ANTIVERT Take 1/2-1 tablet three times a day as needed for vertigo. medroxyPROGESTERone 150 mg/mL injection Commonly known as:  DEPO-PROVERA  
  
 mycophenolate 500 mg tablet Commonly known as:  CELLCEPT  
TAKE 1 TABLET BY MOUTH TWICE A DAY  
  
 ursodiol 300 mg capsule Commonly known as:  ACTIGALL TAKE 3 CAPSULES BY MOUTH EVERY DAY Prescriptions Sent to Pharmacy Refills  
 meclizine (ANTIVERT) 25 mg tablet 0 Sig: Take 1/2-1 tablet three times a day as needed for vertigo. Class: Normal  
 [facility] #: [phone] Follow-up Instructions Return if symptoms worsen or fail to improve. Patient Instructions Meclizine 25mg 1/2-1 tablet three times a day as needed for vertigo. flonase 2 sprays each nostril once a day. Cawthorne Exercises for Vertigo: Care Instructions Your Care Instructions Simple exercises can help you regain your balance when you have vertigo. If you have Ménière's disease, benign paroxysmal positional vertigo (BPPV), or another inner ear problem, you may have vertigo off and on. Do these exercises first thing in the morning and before you go to bed. You might get dizzy when you first start them. If this happens, try to do them for at least 5 minutes. Do a group of exercises at a time, starting at the top of the list. It may take several weeks before you can do all the exercises without feeling dizzy. Follow-up care is a key part of your treatment and safety. Be sure to make and go to all appointments, and call your doctor if you are having problems. It's also a good idea to know your test results and keep a list of the medicines you take. How can you care for yourself at home? Exercise 1 While sitting on the side of the bed and holding your head still: 
· Look up as far as you can. · Look down as far as you can. · Look from side to side as far as you can. · Stretch your arm straight out in front of you. Focus on your index finger. Continue to focus on your finger while you bring it to your nose. Exercise 2 While sitting on the side of the bed: · Bring your head as far back as you can. · Bring your head forward to touch your chin to your chest. 
· Turn your head from side to side. · Do these exercises first with your eyes open. Then try with your eyes closed. Exercise 3 While sitting on the side of the bed: · Shrug your shoulders straight upward, then relax them. · Bend over and try to touch the ground with your fingers. Then go back to a sitting position. · Toss a small ball from one hand to the other. Throw the ball higher than your eyes so you have to look up. Exercise 4 While standing (with someone close by if you feel uncomfortable): 
· Repeat Exercise 1. 
· Repeat Exercise 2. 
· Pass a ball between your legs and above your head. · Sit down and then stand up. Repeat. Turn around in a [place] a different way each time you stand. · With someone close by to help you, try the above exercises with your eyes closed. Exercise 5 In a room that is cleared of obstacles: 
· Walk to a corner of the room, turn to your right, and walk back to the starting point. Now, repeat and turn left. · Walk up and down a slope. Now try stairs. · While holding on to someone's arm, try these exercises with your eyes closed. When should you call for help? Watch closely for changes in your health, and be sure to contact your doctor if: 
· You do not get better as expected. Where can you learn more? Go to http://tiffanie-amara.info/. Enter E904 in the search box to learn more about \"Cawthorne Exercises for Vertigo: Care Instructions. \" Current as of: October 19, 2016 Content Version: 11.3 © 9328-7099 [employer]. Care instructions adapted under license by [employer] (which disclaims liability or warranty for this information). If you have questions about a medical condition or this instruction, always ask your healthcare professional. [address] any warranty or liability for your use of this information. Introducing [facility] SERVICES! Dear Gil Ribeiro: Thank you for requesting a DNS:Net account. Our records indicate that you already have an active DNS:Net account. You can access your account anytime at [email or web address] Did you know that you can access your hospital and ER discharge instructions at any time in DNS:Net? You can also review all of your test results from your hospital stay or ER visit. Additional Information If you have questions, please visit the Frequently Asked Questions section of the DNS:Net website at [email or web address] Remember, DNS:Net is NOT to be used for urgent needs. For medical emergencies, dial 911. Now available from your iPhone and Android! Please provide this summary of care documentation to your next provider. Your primary care clinician is listed as Francisco J Reardon. If you have any questions after today's visit, please call [phone].

## 2017-10-13 NOTE — PROGRESS NOTES
Chief Complaint   Patient presents with    Dizziness     x 9 days. Visit Vitals    /72 (BP 1 Location: Left arm, BP Patient Position: Sitting)    Pulse 80    Temp 98.2 °F (36.8 °C) (Oral)    Resp 18    Ht 5' 6\" (1.676 m)    Wt 168 lb 3.2 oz (76.3 kg)    SpO2 99%    BMI 27.15 kg/m2     Reviewed record In preparation for visit and have obtained necessary documentation    1. Have you been to the ER, urgent care clinic since your last visit? Hospitalized since your last visit? NO    2. Have you seen or consulted any other health care providers outside of the [address] [place] [address] since your last visit? Include any pap smears or colon screening. NO    Patient does not have advance directive on file .

## 2017-10-13 NOTE — PROGRESS NOTES
Gunner Palmer is a 56 y.o. female who presents with vertigo for 9 days. Prior vertigo only lasted for 1-2 days. This episode is getting worse over the past 2 days. Some ear popping. No ear pain. Some sinus headache. no discolored mucous. No nausea. No medications taken for vertigo. No speech changes. No focal weakness.           Past Medical History:   Diagnosis Date    Abnormal Pap smear     h/o cryo    Autoimmune hepatitis ([facility] 6/10/2012    Cholestasis of pregnancy     Elevated LFTs     Herpes simplex without mention of complication     not on Valtrex now (4/26/2012)    PBC (primary biliary cirrhosis)     Psoriatic arthritis ([facility]     Scalp psoriasis     Sicca syndrome, Sjogren's ([facility]        Family History   Problem Relation Age of Onset    Thyroid Disease Father 54     hypothyroidism    High Cholesterol Mother     Cancer Paternal Grandmother      brca, age 63's   Jean Lily Heart Disease Maternal Grandmother     No Known Problems Brother     Suicide Maternal Grandfather     Heart Disease Brother      age 64    Cancer Paternal Aunt      pancreatic cancer       Social History     Social History    Marital status:      Spouse name: N/A    Number of children: 1    Years of education: N/A     Occupational History    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     Social History Main Topics    Smoking status: Never Smoker    Smokeless tobacco: Never Used    Alcohol use 0.6 oz/week     1 Glasses of wine per week      Comment: Once a week    Drug use: No    Sexual activity: Yes     Partners: Male     Birth control/ protection: Injection     Other Topics Concern    Not on file     Social History Narrative       Current Outpatient Prescriptions on File Prior to Visit   Medication Sig Dispense Refill    ursodiol (ACTIGALL) 300 mg capsule TAKE 3 CAPSULES BY MOUTH EVERY DAY 90 Cap 0    apremilast (OTEZLA) 30 mg tab TAKE 30MG BY MOUTH TWO (2) TIMES A DAY 60 Tab 6    mycophenolate (CELLCEPT) 500 mg tablet TAKE 1 TABLET BY MOUTH TWICE A  Tab 0    medroxyPROGESTERone (DEPO-PROVERA) 150 mg/mL injection        No current facility-administered medications on file prior to visit. Review of Systems  Pertinent items are noted in HPI. Objective:     Visit Vitals    /72 (BP 1 Location: Left arm, BP Patient Position: Sitting)    Pulse 80    Temp 98.2 °F (36.8 °C) (Oral)    Resp 18    Ht 5' 6\" (1.676 m)    Wt 168 lb 3.2 oz (76.3 kg)    SpO2 99%    BMI 27.15 kg/m2     Gen: well appearing female  HEENT:   PERRL,normal conjunctiva. External ear and canals normal, TMs no opacification or erythema,  OP no erythema, no exudates, MMM  Neck:  Supple. Thyroid normal size, nontender, without nodules. No masses or LAD  Resp:  No wheezing, no rhonchi, no rales. CV:  RRR, normal S1S2, no murmur. GI: soft, nontender, without masses. No hepatosplenomegaly. Extrem:  +2 pulses, no edema, warm distally  Neuro: alert, nonfocal.       Assessment/Plan:       ICD-10-CM ICD-9-CM    1. Vestibular dizziness R42 386.9 meclizine (ANTIVERT) 25 mg tablet   Meclizine 25mg 1/2-1 tablet three times a day as needed for vertigo. flonase 2 sprays each nostril once a day. Given vertigo exercises. Follow-up Disposition:  Return if symptoms worsen or fail to improve.     Devan Hay MD

## 2017-10-18 RX ORDER — URSODIOL 300 MG/1
CAPSULE ORAL
Qty: 90 CAP | Refills: 0 | Status: SHIPPED | OUTPATIENT
Start: 2017-10-18 | End: 2017-12-11 | Stop reason: SDUPTHER

## 2017-10-25 ENCOUNTER — TELEPHONE (OUTPATIENT)
Dept: RHEUMATOLOGY | Age: 47
End: 2017-10-25

## 2017-10-25 NOTE — TELEPHONE ENCOUNTER
----- Message from Chaya Santa sent at 10/25/2017  8:44 AM EDT -----  Regarding: FW: /Telephone      ----- Message -----     From: Suzy Sr     Sent: 10/24/2017   5:45 PM       To: [facility]  Subject: /Telephone                               Pt needs prior authorization for [employer]. Best contact number is p) [phone] or (f) [phone].

## 2017-10-26 ENCOUNTER — OFFICE VISIT (OUTPATIENT)
Dept: HEMATOLOGY | Age: 47
End: 2017-10-26

## 2017-10-26 VITALS
BODY MASS INDEX: 26.63 KG/M2 | OXYGEN SATURATION: 97 % | HEART RATE: 86 BPM | WEIGHT: 165 LBS | TEMPERATURE: 96 F | DIASTOLIC BLOOD PRESSURE: 76 MMHG | SYSTOLIC BLOOD PRESSURE: 108 MMHG

## 2017-10-26 DIAGNOSIS — L40.50 PSORIATIC ARTHRITIS (HCC): ICD-10-CM

## 2017-10-26 DIAGNOSIS — K74.3 PRIMARY BILIARY CHOLANGITIS (HCC): ICD-10-CM

## 2017-10-26 DIAGNOSIS — K75.4 AUTOIMMUNE HEPATITIS (HCC): Primary | ICD-10-CM

## 2017-10-26 NOTE — PROGRESS NOTES
[address] MD, Judy Camarillo, [address] [place], [place]       WILNER Spencer PA-C Lindi Flank, [facility]   WILNER Perdue NP        at [address], [address] [address]     [phone]     FAX: [phone]    at [address], [id number] [place] [address], [address]     [phone]     FAX: [phone]     PCP: Finley Bosworth, MD    Patient Active Problem List   Diagnosis Code    Restless leg syndrome G25.81    S/P laparoscopic cholecystectomy Z90.49    Autoimmune hepatitis ([facility] K75.4    Primary biliary cholangitis ([facility] K74.3    Psoriatic arthritis ([facility] L40.50       Kiera Garrido returns to the [address] for monitoring and management of autoimmune hepatitis. The active problem list, all pertinent past medical history, medications, liver histology, radiologic findings and laboratory findings related to the liver disorder were reviewed with the patient. This is patient's first office appointment since March 2014. A liver biopsy was performed in 4/2012. This demonstrated severe inflammation and bridging fibrosis. Cellcept and prednisone was initiated and improved her liver enzymes. She has been completely weaned off of prednisone. An MRI and MRCP were performed in 6/2012 and 6/2013 which demonstrated ductal dilatation in the right lobe. SERA was added to see if this would improve her liver enzymes. She is currently being treated with Cellcept 500 mg bid and SERA with good toleration. Her liver transaminases have remained completely normal since SERA was added in 6/2013. She continues to have arthralgias and fatigue due to her psoriatic arthritis. She regularly sees rheumatology to manage these symptoms. Her medications are currently being adjusted to improve her symptoms.     Most recent blood work in September 2017 demonstrated normal liver enzymes and function. The patient completes all daily activities without any functional limitations. She continues to work part time. She denies abdominal pain, changes in bowel habits, dark urine, myalgias, arthralgias and pruritus. Allergies   Allergen Reactions    Ciprofloxacin Itching    Codeine Itching    Demerol [Meperidine] Itching    Sulfa (Sulfonamide Antibiotics) Itching    Ultram [Tramadol] Itching    Vicodin [Hydrocodone-Acetaminophen] Itching     Current Outpatient Prescriptions   Medication Sig    ursodiol (ACTIGALL) 300 mg capsule TAKE 3 CAPSULES BY MOUTH EVERY DAY    meclizine (ANTIVERT) 25 mg tablet Take 1/2-1 tablet three times a day as needed for vertigo.  apremilast (OTEZLA) 30 mg tab TAKE 30MG BY MOUTH TWO (2) TIMES A DAY    mycophenolate (CELLCEPT) 500 mg tablet TAKE 1 TABLET BY MOUTH TWICE A DAY    medroxyPROGESTERone (DEPO-PROVERA) 150 mg/mL injection      No current facility-administered medications for this visit. REVIEW OF SYSTEMS:  Systems related to all organ systems were reviewed. All were negative except as noted above. FAMILY/SOCIAL HISTORY:  The patient is . There is 1 child. The patient has never used tobacco products. The patient  consumes alcohol on social occasions rarely in excess. 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 PHYSICAL EXAMINATION:  Visit Vitals    /76    Pulse 86    Temp 96 °F (35.6 °C) (Oral)    Wt 165 lb (74.8 kg)    SpO2 97%    BMI 26.63 kg/m2     General: No acute distress. Eyes: Sclera anicteric. ENT: No oral lesions. Thyroid normal.  Nodes: No adenopathy. Skin: No spider angiomata. No jaundice. No palmar erythema. Respiratory: Lungs clear to auscultation. Cardiovascular: Regular heart rate. No murmurs. No JVD. Abdomen: Soft non-tender. Liver size normal to percussion/palpation. Spleen not palpable.  No obvious ascites. Extremities: No edema. No muscle wasting. No gross arthritic changes. Neurologic: Alert and oriented. Cranial nerves grossly intact. No asterixsis. LABORATORY STUDIES:  Liver [place] of [address] & Units 9/25/2017 4/5/2017 6/13/2016   WBC 3.4 - 10.8 x10E3/uL 6.9 6.9 7.8   ANC 1.4 - 7.0 X10E3/uL 3.7  4.8   HGB 11.1 - 15.9 g/dL 14.4 13.4 13.1    - 379 x10E3/uL 309 267 304   AST 0 - 40 IU/L 12 11 15   ALT 0 - 32 IU/L 10 6 12   Alk Phos 39 - 117 IU/L 50 43 51   Bili, Total 0.0 - 1.2 mg/dL 0.4 <0.2 0.4   Bili, Direct 0.00 - 0.40 mg/dL      Albumin 3.5 - 5.5 g/dL 4.7 4.6 4.6   BUN 6 - 24 mg/dL 14 13 13   Creat 0.57 - 1.00 mg/dL 0.66 0.67 0.68   Na 134 - 144 mmol/L 138 141 140   K 3.5 - 5.2 mmol/L 4.1 4.5 4.3   Cl 96 - 106 mmol/L 102 102 100   CO2 18 - 29 mmol/L 23 22 23   Glucose 65 - 99 mg/dL 91 97 117 (H)     SEROLOGIES:  Serologies Latest Ref Rn 3/29/2012 3/5/2012 12/8/2011   Hep A Ab, Total Negative Negative     Hep B Surface Ag Negative Negative     Hep B Core Ab, Total Negative Negative     Hep B Surface Ab 0.00 - 0.99 Index Value <0.1     Ferritin 13 - 150 ng/mL 95     Iron % Saturation 15 - 55 % 32     HEMAL Ab, Direct Negative   Negative   HEMAL, IFA  Negative     C-ANCA Neg:<1:20 titer <1:20     P-ANCA Neg:<1:20 titer <1:20     ANCA Neg:<1:20 titer <1:20     ASMCA 0 - 19 Units 4     M2 Ab 0.0 - 20.0 Units 4.8     Anti-SLA 0.0 - 20.0 units 3.4     LKM 0.0 - 20.0 Units 2.0     Ceruloplasmin 16.0 - 45.0 mg/dL  56.2 (H)    Alpha-1 antitrypsin level 90 - 200 mg/dL 195       LIVER HISTOLOGY:  5/2012. Reviewed by MLS. Severe active hepatitis with bile duct changes, proliferation and destruction. Bridging fibrosis. Histologically consistent with AIH-PBC overlap syndrome. ENDOSCOPIC PROCEDURES:  Not available or performed    RADIOLOGY:  9/2011. Ultrasound of liver. Normal appearing liver. No liver mass lesions. 6/2012. MRI abdomen. Normal appearing liver.   No liver mass lesions. No dilated bile ducts. No bile duct strictures. No ascites. 6/2013. MRI of abdomen. Several new small subcapsular areas of ductal dilatation with associated non-masslike parenchymal enhancement predominantly within the right hepatic lobe. No signs of fibrosis are demonstrated. No discrete mass lesions are noted. OTHER TESTING:  Not available or performed    ASSESSMENT AND PLAN:  Autoimmune hepatitis with severe inflammation and bridging fibrosis in 2012. Her liver transaminases remain normal with Cellcept daily. Directed patient to continue. She is very stable from a liver standpoint. PBC. Continue SERA daily. This normalized her liver enzymes. Fibroscan. Discussed reassessing her liver fibrosis with our in-office FibroScan. This is an alternative to the liver biopsy to see how her fibrosis has improved with being treated over the last 5 years. She is in agreement. This will be scheduled in the near future. Psoriatic arthritis. Rheumatology would like to switch her medication to Cosyntex or Orencia to better manage her symptoms. Either medication is safe from a liver standpoint as long as she is being monitored on a regular basis. Her HBV serologies were recently repeated appropriately. Results were negative. Follow-up [facility] [address] in 1 year. Dr. Pérez Naranjo was available during this visit.     Abigail Villasenor, WILNER  [address] [address], [address] [place] [address], [address] [place] [address]  Ph: [phone]  Fax: [phone]

## 2017-10-26 NOTE — MR AVS SNAPSHOT
Visit Information Date & Time Provider Department Dept. Phone Encounter #  
 10/26/2017 12:45 PM April LINDA Barahona, [address] [facility] [place]note 2] [id number] Your Appointments 1/30/2018 10:00 AM  
ESTABLISHED PATIENT with Chary Menendez MD  
[address] ([facility]) Appt Note: 4 mo fu  
 [facility] [address]  
[phone]  
  
   
 [address] [address]  
  
    
 10/16/2018 10:00 AM  
Follow Up with Tonya Morales NP [address] ([facility]) Appt Note: Follow up [address] [facility] Brian [phone] [facility] [address]  
[address] Upcoming Health Maintenance Date Due Pneumococcal 19-64 Medium Risk (1 of 1 - PPSV23) 7/23/1990 DTaP/Tdap/Td series (1 - Tdap) 7/23/1992 PAP AKA CERVICAL CYTOLOGY 10/1/2016 INFLUENZA AGE 9 TO ADULT 8/1/2017 Allergies as of 10/26/2017  Review Complete On: 10/26/2017 By: Tonya Morales NP Severity Noted Reaction Type Reactions Ciprofloxacin  12/22/2009    Itching Codeine  12/22/2009    Itching Demerol [Meperidine]  12/22/2009    Itching Sulfa (Sulfonamide Antibiotics)  12/22/2009    Itching Ultram [Tramadol]  03/07/2011    Itching Vicodin [Hydrocodone-acetaminophen]  12/22/2009    Itching Current Immunizations  Never Reviewed Name Date MMR Vaccine 3/10/2011 11:00 AM  
  
 Not reviewed this visit Vitals BP Pulse Temp Weight(growth percentile) SpO2 BMI  
 108/76 86 96 °F (35.6 °C) (Oral) 165 lb (74.8 kg) 97% 26.63 kg/m2 OB Status Smoking Status Injection Never Smoker BMI and BSA Data Body Mass Index Body Surface Area  
 26.63 kg/m 2 1.87 m 2 Preferred Pharmacy Pharmacy Name Phone  [id number] [address] [address], [address] [phone] Your Updated Medication List  
  
   
This list is accurate as of: 10/26/17  1:12 PM.  Always use your most recent med list.  
  
  
  
  
 apremilast 30 mg Tab Commonly known as:  Maximo Kanaris TAKE 30MG BY MOUTH TWO (2) TIMES A DAY  
  
 meclizine 25 mg tablet Commonly known as:  ANTIVERT Take 1/2-1 tablet three times a day as needed for vertigo. medroxyPROGESTERone 150 mg/mL injection Commonly known as:  DEPO-PROVERA  
  
 mycophenolate 500 mg tablet Commonly known as:  CELLCEPT  
TAKE 1 TABLET BY MOUTH TWICE A DAY  
  
 ursodiol 300 mg capsule Commonly known as:  ACTIGALL TAKE 3 CAPSULES BY MOUTH EVERY DAY To-Do List   
 11/30/2017 10:30 AM  
  Appointment with Tonya Díaz NP at [place] [address] ([phone]) [facility]! Dear Davina Morales: Thank you for requesting a [employer] account. Our records indicate that you already have an active [employer] account. You can access your account anytime at [email or web address] Live Chat. [email or web address] Live Chat Did you know that you can access your hospital and ER discharge instructions at any time in NextPrinciples? You can also review all of your test results from your hospital stay or ER visit. Additional Information If you have questions, please visit the Frequently Asked Questions section of the [employer] website at [email or web address] Live [email or web address]/Userlike Live Chat/. Remember, Next[employer]iples is NOT to be used for urgent needs. For medical emergencies, dial 911. Now available from your iPhone and Android! Please provide this summary of care documentation to your next provider. Your primary care clinician is listed as General Roblero. If you have any questions after today's visit, please call [phone].

## 2017-11-16 ENCOUNTER — TELEPHONE (OUTPATIENT)
Dept: RHEUMATOLOGY | Age: 47
End: 2017-11-16

## 2017-11-16 NOTE — TELEPHONE ENCOUNTER
Faxed prior auth request to Karthikeyan Weeks for Cosentyx for loading, and maintenance dosing for insurance approval.

## 2017-11-21 ENCOUNTER — TELEPHONE (OUTPATIENT)
Dept: RHEUMATOLOGY | Age: 47
End: 2017-11-21

## 2017-11-21 NOTE — TELEPHONE ENCOUNTER
Left message for patient received a fax from [facility] stating Cosentyx has been approved, and patient has zero copay, will get the Cosentyx 11/22/2017. Left instructions to call the office if she needs injection teaching.

## 2017-11-30 ENCOUNTER — OFFICE VISIT (OUTPATIENT)
Dept: HEMATOLOGY | Age: 47
End: 2017-11-30

## 2017-11-30 VITALS
SYSTOLIC BLOOD PRESSURE: 135 MMHG | OXYGEN SATURATION: 100 % | BODY MASS INDEX: 27.12 KG/M2 | DIASTOLIC BLOOD PRESSURE: 91 MMHG | HEART RATE: 86 BPM | WEIGHT: 168 LBS | TEMPERATURE: 98 F

## 2017-11-30 DIAGNOSIS — K75.4 AUTOIMMUNE HEPATITIS (HCC): Primary | ICD-10-CM

## 2017-11-30 DIAGNOSIS — K74.3 PRIMARY BILIARY CHOLANGITIS (HCC): ICD-10-CM

## 2017-11-30 NOTE — MR AVS SNAPSHOT
Visit Information Date & Time Provider Department Dept. Phone Encounter #  
 11/30/2017 10:30 AM April LINDA Rich, [address] [facility] 219 [id number] Your Appointments 1/30/2018 10:00 AM  
ESTABLISHED PATIENT with Cassie Quesada MD  
[address] ([address]) Appt Note: 4 mo fu  
 [facility] [address]  
[phone]  
  
   
 [address] [facility] [address]  
  
    
 10/16/2018 10:00 AM  
Follow Up with WILNER [address] ([address]) Appt Note: Follow up [address] [facility] [address] [phone] [facility] [address]  
[address] [facility] [address] Upcoming Health Maintenance Date Due Pneumococcal 19-64 Medium Risk (1 of 1 - PPSV23) 7/23/1990 DTaP/Tdap/Td series (1 - Tdap) 7/23/1992 PAP AKA CERVICAL CYTOLOGY 10/1/2016 Influenza Age 5 to Adult 8/1/2017 Allergies as of 11/30/2017  Review Complete On: 11/30/2017 By: Azam Prakash Severity Noted Reaction Type Reactions Ciprofloxacin  12/22/2009    Itching Codeine  12/22/2009    Itching Demerol [Meperidine]  12/22/2009    Itching Sulfa (Sulfonamide Antibiotics)  12/22/2009    Itching Ultram [Tramadol]  03/07/2011    Itching Vicodin [Hydrocodone-acetaminophen]  12/22/2009    Itching Current Immunizations  Never Reviewed Name Date MMR Vaccine 3/10/2011 11:00 AM  
  
 Not reviewed this visit You Were Diagnosed With   
  
 Codes Comments Autoimmune hepatitis ([facility] [address]    -  Primary ICD-10-CM: K75.4 ICD-9-CM: 571.42 Primary biliary cholangitis (HCC)     ICD-10-CM: K74.3 ICD-9-CM: 571.6 Vitals BP Pulse Temp Weight(growth percentile) SpO2 BMI  
 (!) 135/91 86 98 °F (36.7 °C) (Oral) 168 lb (76.2 kg) 100% 27.12 kg/m2 OB Status Smoking Status Injection Never Smoker BMI and BSA Data Body Mass Index Body Surface Area  
 27.12 kg/m 2 1.88 m 2 Preferred Pharmacy Pharmacy Name Phone [facility] [phone] Your Updated Medication List  
  
   
This list is accurate as of: 11/30/17 11:42 AM.  Always use your most recent med list.  
  
  
  
  
 apremilast 30 mg Tab Commonly known as:  [place] Showman TAKE 30MG BY MOUTH TWO (2) TIMES A DAY  
  
 COSENTYX SC  
by SubCUTAneous route. meclizine 25 mg tablet Commonly known as:  ANTIVERT Take 1/2-1 tablet three times a day as needed for vertigo. medroxyPROGESTERone 150 mg/mL injection Commonly known as:  DEPO-PROVERA  
  
 mycophenolate 500 mg tablet Commonly known as:  CELLCEPT  
TAKE 1 TABLET BY MOUTH TWICE A DAY  
  
 ursodiol 300 mg capsule Commonly known as:  ACTIGALL TAKE 3 CAPSULES BY MOUTH EVERY DAY We Performed the Following LIVER ELASTOGRAPHY W/O IMAG W/I&R [27183 CPT(R)] METABOLIC PANEL, COMPREHENSIVE [94819 CPT(R)] Introducing [facility] SERVICES! Dear Nasrin Teran: Thank you for requesting a Alt[employer] account. Our records indicate that you already have an active Alt[employer] account. You can access your account anytime at [email or web address].". SlideJar/"[employer] Did you know that you can access your hospital and ER discharge instructions at any time in AltspaceVR? You can also review all of your test results from your hospital stay or ER visit. Additional Information If you have questions, please visit the Frequently Asked Questions section of the Alt[employer] website at [email or web address]. SlideJar/"[employer]/. Remember, Altspace[employer] is NOT to be used for urgent needs. For medical emergencies, dial 911. Now available from your iPhone and Android! Please provide this summary of care documentation to your next provider. Your primary care clinician is listed as Chip Castaneda.  If you have any questions after today's visit, please call [phone].

## 2017-11-30 NOTE — PROGRESS NOTES
[address] MD [address], [address], [place], [place]       WILNER Wilson PA-C Suan Goes, VEENAP-BC   WILNER Bae NP        at [facility]     [address] [facility], [address] [address]     [phone]     FAX: [phone]    at [address], [address] [place] [address], [address]     [phone]     FAX: [phone]     PCP: Hitesh Wset MD    Patient Active Problem List   Diagnosis Code    Restless leg syndrome G25.81    S/P laparoscopic cholecystectomy Z90.49    Autoimmune hepatitis ([facility] [address] K75.4    Primary biliary cholangitis ([facility] [address] K74.3    Psoriatic arthritis ([facility] [address] L40.50       Bambi Ellison returns to the [address] for monitoring and management of autoimmune hepatitis. The active problem list, all pertinent past medical history, medications, liver histology, radiologic findings and laboratory findings related to the liver disorder were reviewed with the patient. A liver biopsy was performed in 4/2012. This demonstrated severe inflammation and bridging fibrosis. Cellcept and prednisone was initiated and improved her liver enzymes. She has been completely weaned off of prednisone. An MRI and MRCP were performed in 6/2012 and 6/2013 which demonstrated ductal dilatation in the right lobe. SERA was added to see if this would improve her liver enzymes. She is currently being treated with Cellcept 500 mg bid and SERA with good toleration. Her liver transaminases have remained completely normal since SERA was added in 6/2013. Patient presents to the clinic today for a Fibroscan to reassess liver fibrosis now that her AIH and PBC have been well controlled for 4 years. She continues to have arthralgias and fatigue due to her psoriatic arthritis. She regularly sees rheumatology to manage these symptoms.  Her medications are currently being adjusted to improve her symptoms. Most recent blood work in September 2017 demonstrated normal liver enzymes and function. The patient completes all daily activities without any functional limitations. She continues to work part time. She denies abdominal pain, changes in bowel habits, dark urine, myalgias, arthralgias and pruritus. ALLERGIES:  Allergies   Allergen Reactions    Ciprofloxacin Itching    Codeine Itching    Demerol [Meperidine] Itching    Sulfa (Sulfonamide Antibiotics) Itching    Ultram [Tramadol] Itching    Vicodin [Hydrocodone-Acetaminophen] Itching     MEDICATION:  Current Outpatient Prescriptions   Medication Sig    SECUKINUMAB (COSENTYX SC) by SubCUTAneous route.  ursodiol (ACTIGALL) 300 mg capsule TAKE 3 CAPSULES BY MOUTH EVERY DAY    mycophenolate (CELLCEPT) 500 mg tablet TAKE 1 TABLET BY MOUTH TWICE A DAY    medroxyPROGESTERone (DEPO-PROVERA) 150 mg/mL injection      No current facility-administered medications for this visit. REVIEW OF SYSTEMS:  Systems related to all organ systems were reviewed. All were negative except as noted above. FAMILY/SOCIAL HISTORY:  The patient is . There is 1 child. The patient has never used tobacco products. The patient  consumes alcohol on social occasions rarely in excess. 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 PHYSICAL EXAMINATION:  Visit Vitals    BP (!) 135/91    Pulse 86    Temp 98 °F (36.7 °C) (Oral)    Wt 168 lb (76.2 kg)    SpO2 100%    BMI 27.12 kg/m2     General: No acute distress. Eyes: Sclera anicteric. ENT: No oral lesions. Thyroid normal.  Nodes: No adenopathy. Skin: No spider angiomata. No jaundice. No palmar erythema. Respiratory: Lungs clear to auscultation. Cardiovascular: Regular heart rate. No murmurs. No JVD. Abdomen: Soft non-tender. Liver size normal to percussion/palpation. Spleen not palpable.  No obvious ascites. Extremities: No edema. No muscle wasting. No gross arthritic changes. Neurologic: Alert and oriented. Cranial nerves grossly intact. No asterixsis. LABORATORY STUDIES:  Liver [place] of [address] & Units 9/25/2017 4/5/2017 6/13/2016   WBC 3.4 - 10.8 x10E3/uL 6.9 6.9 7.8   ANC 1.4 - 7.0 X10E3/uL 3.7  4.8   HGB 11.1 - 15.9 g/dL 14.4 13.4 13.1    - 379 x10E3/uL 309 267 304   AST 0 - 40 IU/L 12 11 15   ALT 0 - 32 IU/L 10 6 12   Alk Phos 39 - 117 IU/L 50 43 51   Bili, Total 0.0 - 1.2 mg/dL 0.4 <0.2 0.4   Bili, Direct 0.00 - 0.40 mg/dL      Albumin 3.5 - 5.5 g/dL 4.7 4.6 4.6   BUN 6 - 24 mg/dL 14 13 13   Creat 0.57 - 1.00 mg/dL 0.66 0.67 0.68   Na 134 - 144 mmol/L 138 141 140   K 3.5 - 5.2 mmol/L 4.1 4.5 4.3   Cl 96 - 106 mmol/L 102 102 100   CO2 18 - 29 mmol/L 23 22 23   Glucose 65 - 99 mg/dL 91 97 117 (H)     SEROLOGIES:  Serologies Latest Ref Rn 3/29/2012 3/5/2012 12/8/2011   Hep A Ab, Total Negative Negative     Hep B Surface Ag Negative Negative     Hep B Core Ab, Total Negative Negative     Hep B Surface Ab 0.00 - 0.99 Index Value <0.1     Ferritin 13 - 150 ng/mL 95     Iron % Saturation 15 - 55 % 32     HEMAL Ab, Direct Negative   Negative   HEMAL, IFA  Negative     C-ANCA Neg:<1:20 titer <1:20     P-ANCA Neg:<1:20 titer <1:20     ANCA Neg:<1:20 titer <1:20     ASMCA 0 - 19 Units 4     M2 Ab 0.0 - 20.0 Units 4.8     Anti-SLA 0.0 - 20.0 units 3.4     LKM 0.0 - 20.0 Units 2.0     Ceruloplasmin 16.0 - 45.0 mg/dL  56.2 (H)    Alpha-1 antitrypsin level 90 - 200 mg/dL 195       LIVER HISTOLOGY:  5/2012. Reviewed by MLS. Severe active hepatitis with bile duct changes, proliferation and destruction. Bridging fibrosis. Histologically consistent with AIH-PBC overlap syndrome. 11/2017. FibroScan performed at [address]. EkPa was 3.3. Suggested fibrosis level is F0. ENDOSCOPIC PROCEDURES:  Not available or performed    RADIOLOGY:  9/2011. Ultrasound of liver.  Normal appearing liver. No liver mass lesions. 6/2012. MRI abdomen. Normal appearing liver. No liver mass lesions. No dilated bile ducts. No bile duct strictures. No ascites. 6/2013. MRI of abdomen. Several new small subcapsular areas of ductal dilatation with associated non-masslike parenchymal enhancement predominantly within the right hepatic lobe. No signs of fibrosis are demonstrated. No discrete mass lesions are noted. OTHER TESTING:  Not available or performed    ASSESSMENT AND PLAN:  Autoimmune hepatitis with severe inflammation and bridging fibrosis in 2012. Her liver transaminases remain normal with Cellcept daily. Directed patient to continue. She is very stable from a liver standpoint. PBC. Continue SERA daily. This normalized her liver enzymes. Fibrosis. FibroScan now demonstrates no fibrosis today after successful treatment of both AIH and PBC. Results were discussed in detail with patient. She is very stable from a liver standpoint. Psoriatic arthritis. Rheumatology switched her medication to Cosyntex 2 weeks ago to better manage her symptoms. It is very safe from a liver standpoint to take any medication needed for management of her psoriatic arthritis. She has tolerated this medication well so far. Follow-up Sumanth Castillo 32 in 1 year. Dr. Tori Morel was available during this visit.     Glenis Mayfield, WILNER  [address] [place] [address], [address] [place] [address], [address] [place] [address]  Ph: [phone]  Fax: [phone]

## 2018-01-15 RX ORDER — MYCOPHENOLATE MOFETIL 500 MG/1
TABLET ORAL
Qty: 310 TAB | Refills: 0 | Status: SHIPPED | OUTPATIENT
Start: 2018-01-15 | End: 2018-07-26 | Stop reason: SDUPTHER

## 2018-02-21 RX ORDER — URSODIOL 300 MG/1
CAPSULE ORAL
Qty: 90 CAP | Refills: 11 | Status: SHIPPED | OUTPATIENT
Start: 2018-02-21 | End: 2018-02-23 | Stop reason: SDUPTHER

## 2018-02-21 NOTE — TELEPHONE ENCOUNTER
Patient is requesting a 90 day supply of   Requested Prescriptions     Pending Prescriptions Disp Refills    ursodiol (ACTIGALL) 300 mg capsule 90 Cap 11

## 2018-02-23 RX ORDER — URSODIOL 300 MG/1
CAPSULE ORAL
Qty: 90 CAP | Refills: 11 | Status: SHIPPED | OUTPATIENT
Start: 2018-02-23 | End: 2018-07-26 | Stop reason: SDUPTHER

## 2018-02-23 NOTE — TELEPHONE ENCOUNTER
Requested Prescriptions     Pending Prescriptions Disp Refills    ursodiol (ACTIGALL) 300 mg capsule 90 Cap 11     Sig: TAKE 3 CAPSULES BY MOUTH EVERY DAY

## 2018-03-01 DIAGNOSIS — L40.50 PSA (PSORIATIC ARTHRITIS) (HCC): Primary | ICD-10-CM

## 2018-04-19 ENCOUNTER — TELEPHONE (OUTPATIENT)
Dept: RHEUMATOLOGY | Age: 48
End: 2018-04-19

## 2018-04-19 NOTE — TELEPHONE ENCOUNTER
Sent My Chart message to patient needs to have her labs done first before refill can be done on Cosentyx per Dr. Don Leyva.

## 2018-07-05 DIAGNOSIS — K74.3 PRIMARY BILIARY CHOLANGITIS (HCC): ICD-10-CM

## 2018-07-05 DIAGNOSIS — K75.4 AUTOIMMUNE HEPATITIS (HCC): Primary | ICD-10-CM

## 2018-07-11 LAB
ALBUMIN SERPL-MCNC: 4.8 G/DL (ref 3.5–5.5)
ALBUMIN/GLOB SERPL: 2 {RATIO} (ref 1.2–2.2)
ALP SERPL-CCNC: 44 IU/L (ref 39–117)
ALT SERPL-CCNC: 17 IU/L (ref 0–32)
AST SERPL-CCNC: 15 IU/L (ref 0–40)
BILIRUB SERPL-MCNC: 0.4 MG/DL (ref 0–1.2)
BUN SERPL-MCNC: 13 MG/DL (ref 6–24)
BUN/CREAT SERPL: 19 (ref 9–23)
CALCIUM SERPL-MCNC: 9.4 MG/DL (ref 8.7–10.2)
CHLORIDE SERPL-SCNC: 100 MMOL/L (ref 96–106)
CO2 SERPL-SCNC: 23 MMOL/L (ref 20–29)
CREAT SERPL-MCNC: 0.7 MG/DL (ref 0.57–1)
ERYTHROCYTE [DISTWIDTH] IN BLOOD BY AUTOMATED COUNT: 12.9 % (ref 12.3–15.4)
GLOBULIN SER CALC-MCNC: 2.4 G/DL (ref 1.5–4.5)
GLUCOSE SERPL-MCNC: 139 MG/DL (ref 65–99)
HCT VFR BLD AUTO: 42.6 % (ref 34–46.6)
HGB BLD-MCNC: 14 G/DL (ref 11.1–15.9)
MCH RBC QN AUTO: 31.7 PG (ref 26.6–33)
MCHC RBC AUTO-ENTMCNC: 32.9 G/DL (ref 31.5–35.7)
MCV RBC AUTO: 97 FL (ref 79–97)
PLATELET # BLD AUTO: 269 X10E3/UL (ref 150–379)
POTASSIUM SERPL-SCNC: 4.2 MMOL/L (ref 3.5–5.2)
PROT SERPL-MCNC: 7.2 G/DL (ref 6–8.5)
RBC # BLD AUTO: 4.41 X10E6/UL (ref 3.77–5.28)
SODIUM SERPL-SCNC: 138 MMOL/L (ref 134–144)
WBC # BLD AUTO: 6.9 X10E3/UL (ref 3.4–10.8)

## 2018-07-26 DIAGNOSIS — K75.4 AUTOIMMUNE HEPATITIS (HCC): Primary | ICD-10-CM

## 2018-07-26 RX ORDER — URSODIOL 300 MG/1
CAPSULE ORAL
Qty: 90 CAP | Refills: 11 | Status: SHIPPED | COMMUNITY
Start: 2018-07-26 | End: 2019-08-21 | Stop reason: SDUPTHER

## 2018-07-26 NOTE — TELEPHONE ENCOUNTER
The pt recently changed mail order pharmacy. She will now be using [facility]. Please send attached scripts as soon as possible.    Requested Prescriptions     Pending Prescriptions Disp Refills    mycophenolate (CELLCEPT) 500 mg tablet 310 Tab 0    ursodiol (ACTIGALL) 300 mg capsule 90 Cap 11     Sig: TAKE 3 CAPSULES BY MOUTH EVERY DAY

## 2018-07-27 RX ORDER — MYCOPHENOLATE MOFETIL 500 MG/1
TABLET ORAL
Qty: 310 TAB | Refills: 0 | Status: SHIPPED | OUTPATIENT
Start: 2018-07-27 | End: 2018-07-30 | Stop reason: SDUPTHER

## 2018-07-30 DIAGNOSIS — K75.4 AUTOIMMUNE HEPATITIS (HCC): ICD-10-CM

## 2018-07-30 RX ORDER — MYCOPHENOLATE MOFETIL 500 MG/1
TABLET ORAL
Qty: 310 TAB | Refills: 0 | Status: SHIPPED | OUTPATIENT
Start: 2018-07-30 | End: 2018-09-21 | Stop reason: SDUPTHER

## 2018-09-21 RX ORDER — MYCOPHENOLATE MOFETIL 500 MG/1
TABLET ORAL
Qty: 310 TAB | Refills: 0 | Status: SHIPPED | OUTPATIENT
Start: 2018-09-21 | End: 2018-09-24 | Stop reason: SDUPTHER

## 2018-09-24 RX ORDER — MYCOPHENOLATE MOFETIL 500 MG/1
TABLET ORAL
Qty: 180 TAB | Refills: 3 | Status: SHIPPED | OUTPATIENT
Start: 2018-09-24 | End: 2019-04-16

## 2018-10-16 ENCOUNTER — OFFICE VISIT (OUTPATIENT)
Dept: HEMATOLOGY | Age: 48
End: 2018-10-16

## 2018-10-16 VITALS
HEART RATE: 83 BPM | WEIGHT: 170.2 LBS | HEIGHT: 66 IN | BODY MASS INDEX: 27.35 KG/M2 | DIASTOLIC BLOOD PRESSURE: 81 MMHG | SYSTOLIC BLOOD PRESSURE: 123 MMHG | TEMPERATURE: 99 F | OXYGEN SATURATION: 97 %

## 2018-10-16 DIAGNOSIS — K74.3 PRIMARY BILIARY CHOLANGITIS (HCC): Primary | ICD-10-CM

## 2018-10-16 DIAGNOSIS — K75.4 AUTOIMMUNE HEPATITIS (HCC): ICD-10-CM

## 2018-10-16 NOTE — PROGRESS NOTES
[address] Yessi Hebert MD, [address], [place], [place]       WILNER Adkins PA-C Romelle Hazy, [facility]   WILNER Lopez NP        at [facility]     [address] [facility], [address], [address] [facility], [address]     [phone]     FAX: [phone]    at [address], [place] [address]., [address]     [phone]     FAX: [phone]     PCP: Suzanne Randhawa MD    Patient Active Problem List   Diagnosis Code    Restless leg syndrome G25.81    S/P laparoscopic cholecystectomy Z90.49    Autoimmune hepatitis ([facility] [address] K75.4    Primary biliary cholangitis ([facility] [address] K74.3    Psoriatic arthritis ([facility] [address] L40.50       Bina Citizen [facility] for monitoring and management of autoimmune hepatitis. The active problem list, all pertinent past medical history, medications, liver histology, radiologic findings and laboratory findings related to the liver disorder were reviewed with the patient. A liver biopsy was performed in 4/2012. This demonstrated severe inflammation and bridging fibrosis. Cellcept and prednisone was initiated and improved her liver enzymes. She has been completely weaned off of prednisone. An MRI and MRCP were performed in 6/2012 and 6/2013 which demonstrated ductal dilatation in the right lobe. SERA was added to see if this would improve her liver enzymes. She is currently being treated with Cellcept 500 mg bid and SERA with good toleration. Her liver transaminases have remained completely normal since SERA was added in 6/2013. Fibroscan was performed in November 2017 to reassess liver fibrosis after AIH and PBC were well controlled for 4 years. This demonstrated no fibrosis. She continues to have arthralgias and fatigue due to her psoriatic arthritis. She regularly sees rheumatology to manage these symptoms.  Her medications are currently being adjusted to improve her symptoms. Most recent blood work in July 2018 demonstrated normal liver enzymes and function. The patient completes all daily activities without any functional limitations. She continues to work full-time. She denies abdominal pain, changes in bowel habits, dark urine, myalgias, arthralgias and pruritus. ALLERGIES:  Allergies   Allergen Reactions    Ciprofloxacin Itching    Codeine Itching    Demerol [Meperidine] Itching    Sulfa (Sulfonamide Antibiotics) Itching    Ultram [Tramadol] Itching    Vicodin [Hydrocodone-Acetaminophen] Itching     MEDICATION:  Current Outpatient Prescriptions   Medication Sig    mycophenolate (CELLCEPT) 500 mg tablet TAKE 1 TABLET BY MOUTH TWICE A DAY    ursodiol (ACTIGALL) 300 mg capsule TAKE 3 CAPSULES BY MOUTH EVERY DAY    medroxyPROGESTERone (DEPO-PROVERA) 150 mg/mL injection      No current facility-administered medications for this visit. SYSTEM REVIEW NOT RELATED TO LIVER DISEASE OR REVIEWED ABOVE:  Constitution systems: Negative for fever, chills, weight gain, weight loss. Eyes: Negative for visual changes. ENT: Negative for sore throat, painful swallowing. Respiratory: Negative for cough, hemoptysis, SOB. Cardiology: Negative for chest pain, palpitations. GI:  Negative for constipation or diarrhea. : Negative for urinary frequency, dysuria, hematuria, nocturia. Skin: Negative for rash. Hematology: Negative for easy bruising, blood clots. Musculo-skelatal: Negative for back pain, muscle pain, weakness. Neurologic: Negative for headaches, dizziness, vertigo, memory problems not related to HE. Psychology: Negative for anxiety, depression. FAMILY/SOCIAL HISTORY:  The patient is . There is 1 child. The patient has never used tobacco products. The patient  consumes alcohol on social occasions rarely in excess.     The patient currently works full-time as consultant for small businesses. PHYSICAL EXAMINATION:  Visit Vitals    /81 (BP 1 Location: Left arm, BP Patient Position: Sitting)    Pulse 83    Temp 99 °F (37.2 °C) (Tympanic)    Ht 5' 6\" (1.676 m)    Wt 170 lb 3.2 oz (77.2 kg)    SpO2 97%    BMI 27.47 kg/m2     General: No acute distress. Eyes: Sclera anicteric. ENT: No oral lesions. Thyroid normal.  Nodes: No adenopathy. Skin: No spider angiomata. No jaundice. No palmar erythema. Respiratory: Lungs clear to auscultation. Cardiovascular: Regular heart rate. No murmurs. No JVD. Abdomen: Soft non-tender. Liver size normal to percussion/palpation. Spleen not palpable. No obvious ascites. Extremities: No edema. No muscle wasting. No gross arthritic changes. Neurologic: Alert and oriented. Cranial nerves grossly intact. No asterixsis.     LABORATORY STUDIES:  Liver [place] of [address] [place] [address] & Units 7/10/2018 9/25/2017 4/5/2017   WBC 3.4 - 10.8 x10E3/uL 6.9 6.9 6.9   ANC 1.4 - 7.0 X10E3/uL  3.7    HGB 11.1 - 15.9 g/dL 14.0 14.4 13.4    - 379 x10E3/uL 269 309 267   AST 0 - 40 IU/L 15 12 11   ALT 0 - 32 IU/L 17 10 6   Alk Phos 39 - 117 IU/L 44 50 43   Bili, Total 0.0 - 1.2 mg/dL 0.4 0.4 <0.2   Albumin 3.5 - 5.5 g/dL 4.8 4.7 4.6   BUN 6 - 24 mg/dL 13 14 13   Creat 0.57 - 1.00 mg/dL 0.70 0.66 0.67   Na 134 - 144 mmol/L 138 138 141   K 3.5 - 5.2 mmol/L 4.2 4.1 4.5   Cl 96 - 106 mmol/L 100 102 102   CO2 20 - 29 mmol/L 23 23 22   Glucose 65 - 99 mg/dL 139 (H) 91 97     SEROLOGIES:  Serologies Latest Ref Rn 3/29/2012 3/5/2012 12/8/2011   Hep A Ab, Total Negative Negative     Hep B Surface Ag Negative Negative     Hep B Core Ab, Total Negative Negative     Hep B Surface Ab 0.00 - 0.99 Index Value <0.1     Ferritin 13 - 150 ng/mL 95     Iron % Saturation 15 - 55 % 32     HEMAL Ab, Direct Negative   Negative   HEMAL, IFA  Negative     C-ANCA Neg:<1:20 titer <1:20     P-ANCA Neg:<1:20 titer <1:20     ANCA Neg:<1:20 titer <1:20     ASMCA 0 - 19 Units 4     M2 Ab 0.0 - 20.0 Units 4.8     Anti-SLA 0.0 - 20.0 units 3.4     LKM 0.0 - 20.0 Units 2.0     Ceruloplasmin 16.0 - 45.0 mg/dL  56.2 (H)    Alpha-1 antitrypsin level 90 - 200 mg/dL 195       LIVER HISTOLOGY:  5/2012. Reviewed by MLS. Severe active hepatitis with bile duct changes, proliferation and destruction. Bridging fibrosis. Histologically consistent with AIH-PBC overlap syndrome. 11/2017. [facility]. EkPa was 3.3. Suggested fibrosis level is F0. ENDOSCOPIC PROCEDURES:  Not available or performed    RADIOLOGY:  9/2011. Ultrasound of liver. Normal appearing liver. No liver mass lesions. 6/2012. MRI abdomen. Normal appearing liver. No liver mass lesions. No dilated bile ducts. No bile duct strictures. No ascites. 6/2013. MRI of abdomen. Several new small subcapsular areas of ductal dilatation with associated non-masslike parenchymal enhancement predominantly within the right hepatic lobe. No signs of fibrosis are demonstrated. No discrete mass lesions are noted. OTHER TESTING:  Not available or performed    ASSESSMENT AND PLAN:  Autoimmune hepatitis/PBC overlap with no fibrosis on FibroScan in 11/2017. Her liver transaminases remain normal with Cellcept and SERA daily. Directed patient to continue. She is very stable from a liver standpoint. Fibrosis. FibroScan now demonstrates no fibrosis today after successful treatment of both AIH and PBC. This will be repeated every 1-2 years to monitor patient. Psoriatic arthritis. Rheumatology switched her medication to Cosyntex 2 weeks ago to better manage her symptoms. It is very safe from a liver standpoint to take any medication needed for management of her psoriatic arthritis. She has tolerated this medication well so far. [facility] 87 in 6 months with FibroScan. Dr. Marcel Patiño was available during this visit.     April Efrain Vargas NP  Liver [place] of [address] [place] [address], [address] [place] [address], [address] [place] [address]  Ph: [phone]  Fax: [phone]

## 2018-10-16 NOTE — PROGRESS NOTES
1. Have you been to the ER, urgent care clinic since your last visit? Hospitalized since your last visit? No    2. Have you seen or consulted any other health care providers outside of the [address] [place] [address] since your last visit? Include any pap smears or colon screening. No     Chief Complaint   Patient presents with    Follow-up     Visit Vitals    /81 (BP 1 Location: Left arm, BP Patient Position: Sitting)    Pulse 83    Temp 99 °F (37.2 °C) (Tympanic)    Ht 5' 6\" (1.676 m)    Wt 170 lb 3.2 oz (77.2 kg)    SpO2 97%    BMI 27.47 kg/m2     PHQ over the last two weeks 10/16/2018   Little interest or pleasure in doing things Not at all   Feeling down, depressed, irritable, or hopeless Not at all   Total Score PHQ 2 0     Learning Assessment 10/16/2018   PRIMARY LEARNER Patient   HIGHEST LEVEL OF EDUCATION - PRIMARY LEARNER  -   BARRIERS PRIMARY LEARNER NONE   CO-LEARNER CAREGIVER -   PRIMARY LANGUAGE ENGLISH   LEARNER PREFERENCE PRIMARY LISTENING   LEARNING SPECIAL TOPICS -   ANSWERED BY patient   RELATIONSHIP SELF     Abuse Screening Questionnaire 10/16/2018   Do you ever feel afraid of your partner? N   Are you in a relationship with someone who physically or mentally threatens you? N   Is it safe for you to go home?  Newell Dakins

## 2018-10-16 NOTE — MR AVS SNAPSHOT
[address] [facility] [phone] [address] [place] [address] 
[phone] Patient: Fabiola Ramirez MRN:  [id number] Visit Information Date & Time Provider Department Dept. Phone Encounter #  
 10/16/2018 10:00 AM April LINDA Ace, [address] [place] Formerly named [facility] 219 [id number] Follow-up Instructions Return in about 6 months (around 4/16/2019) for FibroScan. Upcoming Health Maintenance Date Due Pneumococcal 19-64 Medium Risk (1 of 1 - PPSV23) 7/23/1990 DTaP/Tdap/Td series (1 - Tdap) 7/23/1992 PAP AKA CERVICAL CYTOLOGY 10/1/2016 Influenza Age 5 to Adult 8/1/2018 Allergies as of 10/16/2018  Review Complete On: 10/16/2018 By: April Rukhsana Roberson NP Severity Noted Reaction Type Reactions Ciprofloxacin  12/22/2009    Itching Codeine  12/22/2009    Itching Demerol [Meperidine]  12/22/2009    Itching Sulfa (Sulfonamide Antibiotics)  12/22/2009    Itching Ultram [Tramadol]  03/07/2011    Itching Vicodin [Hydrocodone-acetaminophen]  12/22/2009    Itching Current Immunizations  Never Reviewed Name Date MMR Vaccine 3/10/2011 11:00 AM  
  
 Not reviewed this visit Vitals BP Pulse Temp Height(growth percentile) Weight(growth percentile) SpO2  
 123/81 (BP 1 Location: Left arm, BP Patient Position: Sitting) 83 99 °F (37.2 °C) (Tympanic) 5' 6\" (1.676 m) 170 lb 3.2 oz (77.2 kg) 97% BMI OB Status Smoking Status 27.47 kg/m2 Injection Never Smoker BMI and BSA Data Body Mass Index Body Surface Area  
 27.47 kg/m 2 1.9 m 2 Preferred Pharmacy Pharmacy Name Phone [facility], [address] [address] [place] [address] [address] Your Updated Medication List  
  
   
This list is accurate as of 10/16/18 10:43 AM.  Always use your most recent med list.  
  
  
  
  
 medroxyPROGESTERone 150 mg/mL injection Commonly known as:  DEPO-PROVERA mycophenolate 500 mg tablet Commonly known as:  CELLCEPT  
TAKE 1 TABLET BY MOUTH TWICE A DAY  
  
 ursodiol 300 mg capsule Commonly known as:  ACTIGALL TAKE 3 CAPSULES BY MOUTH EVERY DAY Follow-up Instructions Return in about 6 months (around 4/16/2019) for FibroScan. Introducing \[facility] [place] [facility]\"" & HEALTH SERVICES! Dear Jacinda James: Thank you for requesting a [employer] account. Our records indicate that you already have an active [employer] account. You can access your account anytime at [email or web address] Did you know that you can access your hospital and ER discharge instructions at any time in Neo[employer]? You can also review all of your test results from your hospital stay or ER visit. Additional Information If you have questions, please visit the Frequently Asked Questions section of the Neo[employer] website at [email or web address] Remember, Neo[employer] is NOT to be used for urgent needs. For medical emergencies, dial 911. Now available from your iPhone and Android! Please provide this summary of care documentation to your next provider. Your primary care clinician is listed as Elsi Cote. If you have any questions after today's visit, please call [phone].

## 2018-12-14 ENCOUNTER — OFFICE VISIT (OUTPATIENT)
Dept: RHEUMATOLOGY | Age: 48
End: 2018-12-14

## 2018-12-14 VITALS
SYSTOLIC BLOOD PRESSURE: 124 MMHG | WEIGHT: 163 LBS | DIASTOLIC BLOOD PRESSURE: 85 MMHG | HEIGHT: 66 IN | OXYGEN SATURATION: 98 % | RESPIRATION RATE: 16 BRPM | BODY MASS INDEX: 26.2 KG/M2 | TEMPERATURE: 99.4 F | HEART RATE: 78 BPM

## 2018-12-14 DIAGNOSIS — L40.50 PSA (PSORIATIC ARTHRITIS) (HCC): Primary | ICD-10-CM

## 2018-12-14 NOTE — PROGRESS NOTES
RHEUMATOLOGY PROBLEM LIST AND CHIEF COMPLAINT  1. Psoriatic arthritis - psoriasis, inflammatory arthralgia, fatigue, morning stiffness. Therapy History:  Prior DMARDs: Plaquenil (05/2016-11/2016), Otezla (02/2015-9/2017; AEs: diarrhea), Cosentyx (3 months)  Current DMARDs: Cellcept, orencia (ordered 12/2018)    2. Sicca syndrome - paresthesia, dry mouth  3. Autoimmune hepatitis - previous prednisone for one year, now on CellCept  4. Primary biliary cirrhosis    INTERVAL HISTORY  This is a 53 y.o.  female. Today, the patient complains of pain in the joints. Location: generalized  Severity:  3 on a scale of 0-10  Timing: none at this time   Duration:  2 years  Modifying factors: Otezla AEs  Context/Associated signs and symptoms: The patient's states her psoriasis is flaring. She has not taken medication recently due to insurance issues. She took Cosentyx for 3 months prior to the insurance issues. Her autoimmune hepatitis is in remission, per her Hepatologist. She has decreased Cellcept to 500 mg daily. RHEUMATOLOGY REVIEW OF SYSTEMS   Positives as per history  Negatives as follows:  Jeana Marylu:  Denies unexplained persistent fevers or weight change  RESPIRATORY:  No pleuritic pain, exertional dyspnea  CARDIOVASCULAR:  Denies chest pain  GASTRO:   Denies heartburn, abdominal pain, nausea, vomiting, diarrhea  SKIN:    Denies rash   MSK:    No morning stiffness >1 hour, persistent joint swelling    PAST MEDICAL HISTORY  Reviewed with patient, significant changes in medical history - no    PHYSICAL EXAM  Blood pressure 124/85, pulse 78, temperature 99.4 °F (37.4 °C), temperature source Oral, resp. rate 16, height 5' 6\" (1.676 m), weight 163 lb (73.9 kg), SpO2 98 %. GENERAL APPEARANCE: Well-nourished, no acute distress  NECK: No adenopathy  ENT: No oral ulcers  CARDIOVASCULAR: Heart rhythm is regular. No murmur, rub, gallop  CHEST: Normal vesicular breath sounds.  No wheezes, rales, pleural friction rubs  ABDOMINAL: The abdomen is soft and nontender. Bowel sounds are normal  SKIN: Psoriasis over scalp ~4% BSA  MUSCULOSKELETAL: no SI joint pain. Negative Doron's bilaterally. Improvement in arthralgias with no tenderness over joints today  Upper extremities - full range of motion, no tenderness, no swelling, no synovial thickening and no deformity of joints  Lower extremities - Ankle tenderness, no synovitis    LABS, RADIOLOGY AND PROCEDURES   Previous labs reviewed -Yes    ASSESSMENT  1. Psoriatic arthritis (Established problem -  Progressive disease) - The patient is flaring because she is not taking medication for PsA. This is the first time she has presented with active psoriasis on my physical exam. We will try to start her on Orencia; TNFs are contraindicated because of her liver disease. She should continue with Cellcept per Hepatology. She should return in 4 months for a follow up. I will check labs today. 2. Sicca syndrome - symptomatic treatment for dryness recommended. 3. Drug therapy monitoring for toxicity (cellcept) - CBC, BUN, Cr, AST, ALT and albumin every 3 months  4. Drug therapy monitoring for toxicity (plaquenil ) - CBC, BUN, Cr, AST, ALT and albumin every 3 months; eye exams every 6-12 months for retinal toxicity. 5. New medication - Orencia - A written summary, as prepared by the [employer] of Rheumatology was provided. The patient was given the opportunity to ask questions, and verbalized understanding of the following: The most common side effects reported were those associated with headaches, common colds, sore throat and nausea. Children may have diarrhea, cough, fever and abdominal pain.  Rare infusion reactions have been reported while administering abatacept as an infusion including, anaphylaxis (a severe allergic reaction rapid in onset, occurring in less than 0.5 percent of patients), hives, shortness of breath or low blood pressure (has been reported in less than 1 percent of patients). Nurses will monitor you and your vital signs throughout the infusion. Premedications such as Tylenol or Benadryl can be used preventively. The most important side effect is the risk of developing a serious infection, including pneumonia, tuberculosis and others. Patients are tested for possible tuberculosis with a skin test or blood test before starting this drug. Abatacept should not be combined with another biologic drug, because the combination can increase the risk of riya a serious infection. It is not yet clear if the risk of cancer is higher in patients on abatacept compared to patients with other drugs. Nevertheless, larger reports should demonstrate if there is any trend of possible cancer risk. Patients should not receive live vaccines while receiving abatacept and should be off of the medication before and after a live vaccine is administered. PLAN  1. Orencia approval - TNFs are contraindicated; she can not take enbrel, humira, cimzia, simponi or remicade  2. Symptomatic treatment for sicca syndrome  3. CellCept 500 mg daily per Hepatology  4. Check CBC, CMP, markers of inflammation (ESR, CRP), Hep B, TB  5. Return in 4 months     Steven Kebede MD  Adult and Pediatric Rheumatology     [address] [address], Phone [phone], Fax [phone]   E-mail: [email or web address]    Visiting  of Pediatrics    Department of Pediatrics, [facility] of [address] [place] [address], [address] [place] [address], Phone [phone], Fax [phone]  E-mail: [email or web address]    There are no Patient Instructions on file for this visit. cc:  MD Remy Rodriguez    Written by pantera Beal, as dictated by Goyo Leyva. Stephen Kebede M.D. Total face-to face time was 40 minutes, greater than 50% of which was spent in counseling and coordination of care.   The diagnosis, treatment and various other items were discussed in detail: Test results, medication options, possible side effects, lifestyle changes.

## 2018-12-17 LAB
ALBUMIN SERPL-MCNC: 5 G/DL (ref 3.5–5.5)
ALBUMIN/GLOB SERPL: 2.1 {RATIO} (ref 1.2–2.2)
ALP SERPL-CCNC: 48 IU/L (ref 39–117)
ALT SERPL-CCNC: 13 IU/L (ref 0–32)
AST SERPL-CCNC: 15 IU/L (ref 0–40)
BASOPHILS # BLD MANUAL: 0 X10E3/UL (ref 0–0.2)
BASOPHILS NFR BLD MANUAL: 0 %
BILIRUB SERPL-MCNC: 0.3 MG/DL (ref 0–1.2)
BUN SERPL-MCNC: 13 MG/DL (ref 6–24)
BUN/CREAT SERPL: 17 (ref 9–23)
CALCIUM SERPL-MCNC: 9.7 MG/DL (ref 8.7–10.2)
CHLORIDE SERPL-SCNC: 105 MMOL/L (ref 96–106)
CO2 SERPL-SCNC: 22 MMOL/L (ref 20–29)
COMMENT, 144067: NORMAL
CREAT SERPL-MCNC: 0.76 MG/DL (ref 0.57–1)
CRP SERPL-MCNC: 2 MG/L (ref 0–4.9)
DIFFERENTIAL COMMENT, 115260: NORMAL
EOSINOPHIL # BLD MANUAL: 0 X10E3/UL (ref 0–0.4)
EOSINOPHIL NFR BLD MANUAL: 0 %
ERYTHROCYTE [DISTWIDTH] IN BLOOD BY AUTOMATED COUNT: 12.6 % (ref 12.3–15.4)
ERYTHROCYTE [SEDIMENTATION RATE] IN BLOOD BY WESTERGREN METHOD: 20 MM/HR (ref 0–32)
GLOBULIN SER CALC-MCNC: 2.4 G/DL (ref 1.5–4.5)
GLUCOSE SERPL-MCNC: 86 MG/DL (ref 65–99)
HBV CORE AB SERPL QL IA: NEGATIVE
HBV CORE IGM SERPL QL IA: NEGATIVE
HBV E AB SERPL QL IA: NEGATIVE
HBV E AG SERPL QL IA: NEGATIVE
HBV SURFACE AB SER QL: NON REACTIVE
HBV SURFACE AG SERPL QL IA: NEGATIVE
HCT VFR BLD AUTO: 41.5 % (ref 34–46.6)
HCV AB S/CO SERPL IA: 0.2 S/CO RATIO (ref 0–0.9)
HGB BLD-MCNC: 14.1 G/DL (ref 11.1–15.9)
LYMPHOCYTES # BLD MANUAL: 2.6 X10E3/UL (ref 0.7–3.1)
LYMPHOCYTES NFR BLD MANUAL: 35 %
MCH RBC QN AUTO: 32.6 PG (ref 26.6–33)
MCHC RBC AUTO-ENTMCNC: 34 G/DL (ref 31.5–35.7)
MCV RBC AUTO: 96 FL (ref 79–97)
MONOCYTES # BLD MANUAL: 0.4 X10E3/UL (ref 0.1–0.9)
MONOCYTES NFR BLD MANUAL: 6 %
NEUTROPHILS # BLD MANUAL: 4.4 X10E3/UL (ref 1.4–7)
NEUTROPHILS NFR BLD MANUAL: 59 %
PLATELET # BLD AUTO: 278 X10E3/UL (ref 150–379)
PLATELET BLD QL SMEAR: ADEQUATE
POTASSIUM SERPL-SCNC: 4.5 MMOL/L (ref 3.5–5.2)
PROT SERPL-MCNC: 7.4 G/DL (ref 6–8.5)
RBC # BLD AUTO: 4.32 X10E6/UL (ref 3.77–5.28)
RBC MORPH BLD: NORMAL
SODIUM SERPL-SCNC: 142 MMOL/L (ref 134–144)
WBC # BLD AUTO: 7.4 X10E3/UL (ref 3.4–10.8)

## 2018-12-18 LAB
GAMMA INTERFERON BACKGROUND BLD IA-ACNC: 0.02 IU/ML
M TB IFN-G BLD-IMP: NEGATIVE
M TB IFN-G CD4+ BCKGRND COR BLD-ACNC: 0.04 IU/ML
MITOGEN IGNF BLD-ACNC: >10 IU/ML
QUANTIFERON INCUBATION, QF1T: NORMAL
QUANTIFERON TB2 AG: 0.04 IU/ML
SERVICE CMNT-IMP: NORMAL

## 2019-01-10 ENCOUNTER — DOCUMENTATION ONLY (OUTPATIENT)
Dept: RHEUMATOLOGY | Age: 49
End: 2019-01-10

## 2019-01-11 RX ORDER — CLOBETASOL PROPIONATE 0.5 MG/G
AEROSOL, FOAM TOPICAL 2 TIMES DAILY
Qty: 100 G | Refills: 6 | Status: SHIPPED | OUTPATIENT
Start: 2019-01-11 | End: 2022-02-23

## 2019-01-25 ENCOUNTER — TELEPHONE (OUTPATIENT)
Dept: RHEUMATOLOGY | Age: 49
End: 2019-01-25

## 2019-01-25 NOTE — TELEPHONE ENCOUNTER
Received a fax from [employer] in [place] for approval of Clobetasol Propionate, good until 1/17/2020, File ID [id number].

## 2019-01-29 ENCOUNTER — TELEPHONE (OUTPATIENT)
Dept: RHEUMATOLOGY | Age: 49
End: 2019-01-29

## 2019-01-29 NOTE — TELEPHONE ENCOUNTER
Left voicemail and faxed document to [facility]) stating that pt will self administer Orencia injections at home

## 2019-01-29 NOTE — TELEPHONE ENCOUNTER
----- Message from Mina Najera sent at 1/29/2019  8:44 AM EST -----  Regarding: FW: Dr. Maverick Back      ----- Message -----  From: Brooke Hobbs  Sent: 1/28/2019   1:46 PM  To: [facility] Front Office Pool  Subject: Dr. Toni Mauricio) called regarding an urgent prior auth for medication(\"Orencia\") with procedure code  submitted on 01/26/19,service SWS#[id number]\") Raimundo Pae stated information need to be faxed by 4:00 p.m. [place] time. , and clarification is needed if it's going to be self administered by the pt at home or the doctor. Best contact number [phone], or dept fax [phone].

## 2019-01-30 ENCOUNTER — TELEPHONE (OUTPATIENT)
Dept: RHEUMATOLOGY | Age: 49
End: 2019-01-30

## 2019-01-30 NOTE — TELEPHONE ENCOUNTER
----- Message from oJey Butterfield sent at 1/30/2019  8:55 AM EST -----  Regarding: FW: Dr. Jeannie Babcock      ----- Message -----  From: Ely Dempsey  Sent: 1/29/2019   4:46 PM  To: [facility] Front Office Pool  Subject: Dr. Samy Jain, requesting to speak with the nurse in regards to medication \"Orencia\". The nurse did inform [facility] through e-mail pt will be self administering medication. This will be treated as a \"crave out\" due to self administering medication at home excludes medication from medical benefit coverage. This would have to be requested by pt's pharmacy if pt will be self administing medication [facility]. Inquiring since this will be treated as a \"crave out\" did provider want physical copy of why this is being treated as \"crave out\". Requesting to speak with the nurse in regards to this matter. Best contact number [phone] alternate number (provider services number) [phone] reference number: [id number]. If not answer please leave voicemail message, this is a secure voicemail service.

## 2019-01-30 NOTE — TELEPHONE ENCOUNTER
Spoke to [facility] requested the denial letter and the reason for the denial, [facility] rep acknowledged understanding and stated to give 5 days to receive the denial letter

## 2019-02-01 DIAGNOSIS — L40.50 PSA (PSORIATIC ARTHRITIS) (HCC): Primary | ICD-10-CM

## 2019-02-05 DIAGNOSIS — L40.50 PSA (PSORIATIC ARTHRITIS) (HCC): Primary | ICD-10-CM

## 2019-03-05 ENCOUNTER — TELEPHONE (OUTPATIENT)
Dept: RHEUMATOLOGY | Age: 49
End: 2019-03-05

## 2019-03-05 NOTE — TELEPHONE ENCOUNTER
Called patient and left a message for patient to call the office or use MY Chart to let the office know if she has received Stelara or has gotten any information for the insurance for coverage of the Stelara?

## 2019-04-16 ENCOUNTER — OFFICE VISIT (OUTPATIENT)
Dept: HEMATOLOGY | Age: 49
End: 2019-04-16

## 2019-04-16 VITALS
SYSTOLIC BLOOD PRESSURE: 122 MMHG | WEIGHT: 160.2 LBS | OXYGEN SATURATION: 99 % | HEART RATE: 80 BPM | BODY MASS INDEX: 25.75 KG/M2 | DIASTOLIC BLOOD PRESSURE: 78 MMHG | HEIGHT: 66 IN | TEMPERATURE: 99.1 F

## 2019-04-16 DIAGNOSIS — K75.4 AUTOIMMUNE HEPATITIS (HCC): Primary | ICD-10-CM

## 2019-04-16 DIAGNOSIS — K74.3 PRIMARY BILIARY CHOLANGITIS (HCC): ICD-10-CM

## 2019-04-16 RX ORDER — MYCOPHENOLATE MOFETIL 500 MG/1
TABLET ORAL
Qty: 90 TAB | Refills: 3 | Status: SHIPPED | OUTPATIENT
Start: 2019-04-16 | End: 2019-04-26 | Stop reason: SDUPTHER

## 2019-04-16 NOTE — PROGRESS NOTES
[address] Yessi Hebert MD, [place], [facility], [place]       IVANIA Lyons, [facility]   WILNER Morales NP        at [address], [address] [place] [address], [facility].     [phone]     FAX: [phone]    at [address], [place] [address]., [address]     [phone]     FAX: [phone]     PCP: Danuta Santo MD    Patient Active Problem List   Diagnosis Code    Restless leg syndrome G25.81    S/P laparoscopic cholecystectomy Z90.49    Autoimmune hepatitis ([facility] K75.4    Primary biliary cholangitis ([address] K74.3    Psoriatic arthritis ([facility] L40.50     Chandrika Medel returns to the [address] for monitoring and management of autoimmune hepatitis. The active problem list, all pertinent past medical history, medications, liver histology, radiologic findings and laboratory findings related to the liver disorder were reviewed with the patient. A liver biopsy was performed in 4/2012. This demonstrated severe inflammation and bridging fibrosis. Cellcept and prednisone were initiated and improved her liver enzymes. She has been completely weaned off of prednisone. An MRI and MRCP were performed in 6/2012 and 6/2013 which demonstrated ductal dilatation in the right lobe. SERA was added to see if this would improve her liver enzymes. She is currently being treated with Cellcept 500 mg qd and SERA 900 mg daily with good toleration. Her liver transaminases have remained completely normal since SERA was added in 6/2013. Her Cellcept was decreased from BID to daily but unsure when this was completed. FibroScan was performed in November 2017 to reassess liver fibrosis after AIH and PBC were well controlled for 4 years. This demonstrated no fibrosis.     She continues to have arthralgias and fatigue due to her psoriatic arthritis. She regularly sees rheumatology to manage these symptoms. Her medications are currently being adjusted to improve her symptoms. She has recently started Stelara    Most recent blood work in December 2018 demonstrated normal liver enzymes and function. The patient completes all daily activities without any functional limitations. She continues to work full-time. She denies abdominal pain, changes in bowel habits, dark urine, myalgias, arthralgias and pruritus. ALLERGIES:  Allergies   Allergen Reactions    Ciprofloxacin Itching    Codeine Itching    Demerol [Meperidine] Itching    Sulfa (Sulfonamide Antibiotics) Itching    Ultram [Tramadol] Itching    Vicodin [Hydrocodone-Acetaminophen] Itching     MEDICATION:  Current Outpatient Medications   Medication Sig    mycophenolate (CELLCEPT) 500 mg tablet TAKE 1 TABLET BY MOUTH daily  Indications: autoimmune hepatitis    ustekinumab (STELARA) 45 mg/0.5 mL injection 45mg SQ week 0, 4 and then q12w    ursodiol (ACTIGALL) 300 mg capsule TAKE 3 CAPSULES BY MOUTH EVERY DAY    medroxyPROGESTERone (DEPO-PROVERA) 150 mg/mL injection     clobetasol (OLUX) 0.05 % topical foam Apply  to affected area two (2) times a day. use thin film on affected area (Patient not taking: Reported on 4/16/2019)     Current Facility-Administered Medications   Medication    ustekinumab (STELARA) injection 45 mg     SYSTEM REVIEW NOT RELATED TO LIVER DISEASE OR REVIEWED ABOVE:  Constitution systems: Negative for fever, chills, weight gain, weight loss. Eyes: Negative for visual changes. ENT: Negative for sore throat, painful swallowing. Respiratory: Negative for cough, hemoptysis, SOB. Cardiology: Negative for chest pain, palpitations. GI:  Negative for constipation or diarrhea. : Negative for urinary frequency, dysuria, hematuria, nocturia. Skin: Negative for rash. Hematology: Negative for easy bruising, blood clots.     Musculo-skeletal: Negative for back pain, muscle pain, weakness. Neurologic: Negative for headaches, dizziness, vertigo, memory problems not related to HE. Psychology: Negative for anxiety, depression. FAMILY/SOCIAL HISTORY:  The patient is . There is 1 child. The patient has never used tobacco products. The patient  consumes alcohol on social occasions rarely in excess. 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 PHYSICAL EXAMINATION:  Visit Vitals  /78 (BP 1 Location: Left arm, BP Patient Position: Sitting)   Pulse 80   Temp 99.1 °F (37.3 °C) (Tympanic)   Ht 5' 6\" (1.676 m)   Wt 160 lb 3.2 oz (72.7 kg)   SpO2 99%   BMI 25.86 kg/m²     General: No acute distress. Eyes: Sclera anicteric. ENT: No oral lesions. Nodes: No adenopathy. Skin: No spider angiomata. No jaundice. No palmar erythema. Respiratory: Lungs clear to auscultation. Cardiovascular: Regular heart rate. No murmurs. No JVD. Abdomen: Soft non-tender. Liver size normal to percussion/palpation. Spleen not palpable. No obvious ascites. Extremities: No edema. No muscle wasting. No gross arthritic changes. Neurologic: Alert and oriented. Cranial nerves grossly intact. No asterixis.     LABORATORY STUDIES:  Liver [place] of [address] Units 12/14/2018 7/10/2018   WBC 3.4 - 10.8 x10E3/uL 7.4 6.9   ANC 1.4 - 7.0 x10E3/uL 4.4    HGB 11.1 - 15.9 g/dL 14.1 14.0    - 379 x10E3/uL 278 269   AST 0 - 40 IU/L 15 15   ALT 0 - 32 IU/L 13 17   Alk Phos 39 - 117 IU/L 48 44   Bili, Total 0.0 - 1.2 mg/dL 0.3 0.4   Albumin 3.5 - 5.5 g/dL 5.0 4.8   BUN 6 - 24 mg/dL 13 13   Creat 0.57 - 1.00 mg/dL 0.76 0.70   Na 134 - 144 mmol/L 142 138   K 3.5 - 5.2 mmol/L 4.5 4.2   Cl 96 - 106 mmol/L 105 100   CO2 20 - 29 mmol/L 22 23   Glucose 65 - 99 mg/dL 86 139 (H)     Liver [place] [facility] Latest Ref Rng & Units 9/25/2017   WBC 3.4 - 10.8 x10E3/uL 6.9   ANC 1.4 - 7.0 x10E3/uL 3.7   HGB 11.1 - 15.9 g/dL 14.4    - 379 x10E3/uL 309   AST 0 - 40 IU/L 12   ALT 0 - 32 IU/L 10   Alk Phos 39 - 117 IU/L 50   Bili, Total 0.0 - 1.2 mg/dL 0.4   Albumin 3.5 - 5.5 g/dL 4.7   BUN 6 - 24 mg/dL 14   Creat 0.57 - 1.00 mg/dL 0.66   Na 134 - 144 mmol/L 138   K 3.5 - 5.2 mmol/L 4.1   Cl 96 - 106 mmol/L 102   CO2 20 - 29 mmol/L 23   Glucose 65 - 99 mg/dL 91     SEROLOGIES:  Serologies Latest Ref Rng 3/29/2012 3/5/2012 12/8/2011   Hep A Ab, Total Negative Negative     Hep B Surface Ag Negative Negative     Hep B Core Ab, Total Negative Negative     Hep B Surface Ab 0.00 - 0.99 Index Value <0.1     Ferritin 13 - 150 ng/mL 95     Iron % Saturation 15 - 55 % 32     HEMAL Ab, Direct Negative   Negative   HEMAL, IFA  Negative     C-ANCA Neg:<1:20 titer <1:20     P-ANCA Neg:<1:20 titer <1:20     ANCA Neg:<1:20 titer <1:20     ASMCA 0 - 19 Units 4     M2 Ab 0.0 - 20.0 Units 4.8     Anti-SLA 0.0 - 20.0 units 3.4     LKM 0.0 - 20.0 Units 2.0     Ceruloplasmin 16.0 - 45.0 mg/dL  56.2 (H)    Alpha-1 antitrypsin level 90 - 200 mg/dL 195       LIVER HISTOLOGY:  4/2019. [facility]. EkPa was 4.7. IQR/med 13%. . The results suggested a fibrosis level of F0. The CAP score suggests no evidence of fatty liver. 11/2017. Fib[facility]Scan performed at [facility]. EkPa was 3.3. Suggested fibrosis level is F0.    5/2012. Reviewed by MLS. Severe active hepatitis with bile duct changes, proliferation and destruction. Bridging fibrosis. Histologically consistent with AIH-PBC overlap syndrome. ENDOSCOPIC PROCEDURES:  Not available or performed    RADIOLOGY:  6/2013. MRI of abdomen. Several new small subcapsular areas of ductal dilatation with associated non-mass like parenchymal enhancement predominantly within the right hepatic lobe. No signs of fibrosis are demonstrated. No discrete mass lesions are noted. 6/2012. MRI abdomen. Normal appearing liver. No liver mass lesions. No dilated bile ducts.  No bile duct strictures. No ascites. 9/2011. Ultrasound of liver. Normal appearing liver. No liver mass lesions. 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 Her liver transaminases remain normal with Cellcept and SERA daily. Directed patient to continue. She is very stable from a liver standpoint. Sent over next Cellcept prescription with correct sig. May repeat MRI to see if biliary dilatation still present. Psoriatic arthritis. Rheumatology is now treating her with Stelara. [address] [address] in 6 months to reassess liver enzymes.      Jay Hatrley, [facility]  Liver [place] of [place] [address] [facility], [address] [address]  [address].  [phone]    MDM: 4, NP 4

## 2019-04-16 NOTE — PROGRESS NOTES
1. Have you been to the ER, urgent care clinic since your last visit? Hospitalized since your last visit? No    2. Have you seen or consulted any other health care providers outside of the [address] [place] [address] since your last visit? Include any pap smears or colon screening. No     Chief Complaint   Patient presents with    Follow-up     Fibroscan     Visit Vitals  /78 (BP 1 Location: Left arm, BP Patient Position: Sitting)   Pulse 80   Temp 99.1 °F (37.3 °C) (Tympanic)   Ht 5' 6\" (1.676 m)   Wt 160 lb 3.2 oz (72.7 kg)   SpO2 99%   BMI 25.86 kg/m²     3 most recent PHQ Screens 4/16/2019   Little interest or pleasure in doing things Not at all   Feeling down, depressed, irritable, or hopeless Not at all   Total Score PHQ 2 0     Learning Assessment 4/16/2019   PRIMARY LEARNER Patient   HIGHEST LEVEL OF EDUCATION - PRIMARY LEARNER  -   BARRIERS PRIMARY LEARNER NONE   CO-LEARNER CAREGIVER No   PRIMARY LANGUAGE ENGLISH   LEARNER PREFERENCE PRIMARY LISTENING   LEARNING SPECIAL TOPICS -   ANSWERED BY patient   RELATIONSHIP SELF     Abuse Screening Questionnaire 4/16/2019   Do you ever feel afraid of your partner? N   Are you in a relationship with someone who physically or mentally threatens you? N   Is it safe for you to go home?  [place]

## 2019-04-17 LAB
ALBUMIN SERPL-MCNC: 4.5 G/DL (ref 3.5–5.5)
ALP SERPL-CCNC: 43 IU/L (ref 39–117)
ALT SERPL-CCNC: 12 IU/L (ref 0–32)
AST SERPL-CCNC: 11 IU/L (ref 0–40)
BILIRUB DIRECT SERPL-MCNC: 0.1 MG/DL (ref 0–0.4)
BILIRUB SERPL-MCNC: 0.3 MG/DL (ref 0–1.2)
BUN SERPL-MCNC: 12 MG/DL (ref 6–24)
BUN/CREAT SERPL: 18 (ref 9–23)
CALCIUM SERPL-MCNC: 9.5 MG/DL (ref 8.7–10.2)
CHLORIDE SERPL-SCNC: 102 MMOL/L (ref 96–106)
CO2 SERPL-SCNC: 24 MMOL/L (ref 20–29)
CREAT SERPL-MCNC: 0.66 MG/DL (ref 0.57–1)
ERYTHROCYTE [DISTWIDTH] IN BLOOD BY AUTOMATED COUNT: 12.7 % (ref 12.3–15.4)
GLUCOSE SERPL-MCNC: 95 MG/DL (ref 65–99)
HCT VFR BLD AUTO: 42 % (ref 34–46.6)
HGB BLD-MCNC: 14 G/DL (ref 11.1–15.9)
MCH RBC QN AUTO: 32.5 PG (ref 26.6–33)
MCHC RBC AUTO-ENTMCNC: 33.3 G/DL (ref 31.5–35.7)
MCV RBC AUTO: 97 FL (ref 79–97)
PLATELET # BLD AUTO: 270 X10E3/UL (ref 150–379)
POTASSIUM SERPL-SCNC: 4.6 MMOL/L (ref 3.5–5.2)
PROT SERPL-MCNC: 6.9 G/DL (ref 6–8.5)
RBC # BLD AUTO: 4.31 X10E6/UL (ref 3.77–5.28)
SODIUM SERPL-SCNC: 140 MMOL/L (ref 134–144)
WBC # BLD AUTO: 8 X10E3/UL (ref 3.4–10.8)

## 2019-04-23 ENCOUNTER — TELEPHONE (OUTPATIENT)
Dept: INTERNAL MEDICINE CLINIC | Age: 49
End: 2019-04-23

## 2019-04-23 NOTE — TELEPHONE ENCOUNTER
#[phone] pt has cough, congestion and ear pain. She is very sick and needs to be seen on 4-24-19 she states. (sounds bad)  Pt has not been seen since 2017.   Please call pt

## 2019-04-23 NOTE — TELEPHONE ENCOUNTER
Spoke with patient. Two pt identifiers confirmed. Patient offered an appointment with Dr. Twan Sepulveda on 04/24/19. Appointment accepted. Pt verbalized understanding of information discussed w/ no further questions at this time.    Patient advised if anything changes or if unable to keep this appointment to call the office

## 2019-04-26 NOTE — TELEPHONE ENCOUNTER
Requested Prescriptions     Pending Prescriptions Disp Refills    mycophenolate (CELLCEPT) 500 mg tablet 90 Tab 3     Sig: TAKE 1 TABLET BY MOUTH daily  Indications: autoimmune hepatitis

## 2019-04-29 RX ORDER — MYCOPHENOLATE MOFETIL 500 MG/1
TABLET ORAL
Qty: 90 TAB | Refills: 3 | Status: SHIPPED | OUTPATIENT
Start: 2019-04-29 | End: 2019-10-17 | Stop reason: SDUPTHER

## 2019-05-28 ENCOUNTER — TELEPHONE (OUTPATIENT)
Dept: HEMATOLOGY | Age: 49
End: 2019-05-28

## 2019-05-28 NOTE — TELEPHONE ENCOUNTER
----- Message from Ran Samuel 2906 sent at 5/28/2019  2:34 PM EDT -----  Regarding: WILNER Curiel/ telephone  Christiano Valadez, called because the pt's MRI was denied by her insurance on 5/31.  A peer to peer can be completed by calling [facility] at [phone] option 3 and including case #[id number]      Best contact number is [phone]

## 2019-06-03 ENCOUNTER — DOCUMENTATION ONLY (OUTPATIENT)
Dept: RHEUMATOLOGY | Age: 49
End: 2019-06-03

## 2019-06-03 NOTE — PROGRESS NOTES
Maral Carrier for Stelara was approved with $5 co- pay per dose with up to $20,000 max per calendar year

## 2019-06-05 ENCOUNTER — TELEPHONE (OUTPATIENT)
Dept: HEMATOLOGY | Age: 49
End: 2019-06-05

## 2019-06-05 NOTE — TELEPHONE ENCOUNTER
Late entry documentation    Received [facility] message that peer to peer required prior to MRI. Contacted number listed -  [facility] at [phone] option 3 and including case #[id number]. Was informed by representative Letha Tenorio that the scan was denied on 5/13/19 due to no previous scans US or CT showing masses/lesions. Informed Letha Tenorio the reason for needed MRI is to look for biliary dilation on seen with MRI. Asked to speak with nurse. Request denied. Letha Tenorio stated that a letter was  mailed to patient and office. Letha Tenorio could not see [facility] [place] address listed. Letha Tenorio stated that the physicians office had two weeks from 5/13/19 to contact [facility] to request peer to peer. Peer to peer denied on 5/27/19; one day prior to message received at office. Contacted [facility]. Spoke with representative who stated that the case was not reviewed from  5/10/19 - 5/28/19. That is probably why we received the information so late. This nurse was given supervisor for Maritza Hernandez name and number. [phone]. Left vm. Not response as of today. 6/5/19. Will attempt to contact again. Will review with Dr. Janet Cantrell to determine recommendations.

## 2019-06-05 NOTE — TELEPHONE ENCOUNTER
Left message for patient to return call in regards to her [phone]. Per  will encourage patient to contact  to get status of if she can continue services with [facility]. Will discuss updates about MRI.

## 2019-08-21 RX ORDER — URSODIOL 300 MG/1
CAPSULE ORAL
Qty: 90 CAP | Refills: 11 | Status: SHIPPED | OUTPATIENT
Start: 2019-08-21 | End: 2020-07-14 | Stop reason: SDUPTHER

## 2019-08-21 NOTE — TELEPHONE ENCOUNTER
Received a  New prescription refill request via fax from \[facility]\"" pharmacy  Requested Prescriptions     Pending Prescriptions Disp Refills    ursodiol (ACTIGALL) 300 mg capsule 90 Cap 11     Sig: TAKE 3 CAPSULES BY MOUTH EVERY DAY

## 2019-10-17 ENCOUNTER — OFFICE VISIT (OUTPATIENT)
Dept: HEMATOLOGY | Age: 49
End: 2019-10-17

## 2019-10-17 VITALS
OXYGEN SATURATION: 97 % | SYSTOLIC BLOOD PRESSURE: 128 MMHG | DIASTOLIC BLOOD PRESSURE: 79 MMHG | TEMPERATURE: 97.8 F | WEIGHT: 156 LBS | HEART RATE: 78 BPM | BODY MASS INDEX: 25.18 KG/M2

## 2019-10-17 DIAGNOSIS — K74.3 PRIMARY BILIARY CHOLANGITIS (HCC): Primary | ICD-10-CM

## 2019-10-17 RX ORDER — MYCOPHENOLATE MOFETIL 500 MG/1
TABLET ORAL
Qty: 30 TAB | Refills: 11 | Status: SHIPPED | COMMUNITY
Start: 2019-10-17 | End: 2020-03-12 | Stop reason: SDUPTHER

## 2019-10-17 NOTE — PROGRESS NOTES
[address] Yessi Hebert MD, [place], [place] [facility] Jamal, Himanshu Trinidad, IVANIA Moseley, [facility]   WILNER Singh NP        at [facility]     [facility], [address], [address].     [phone]     FAX: [phone]    at [address], [address] [place] [address], [address]     [phone]     FAX: [phone]     PCP: Lea Edge MD    Patient Active Problem List   Diagnosis Code    Restless leg syndrome G25.81    S/P laparoscopic cholecystectomy Z90.49    Autoimmune hepatitis ([facility] K75.4    Primary biliary cholangitis ([address] K74.3    Psoriatic arthritis ([facility] L40.50     Dianne Cam returns to the [address] for monitoring and management of autoimmune hepatitis. The active problem list, all pertinent past medical history, medications, liver histology, radiologic findings and laboratory findings related to the liver disorder were reviewed with the patient. A liver biopsy was performed in 4/2012. This demonstrated severe inflammation and bridging fibrosis. Cellcept and prednisone were initiated and improved her liver enzymes. She has been completely weaned off of prednisone. An MRI and MRCP were performed in 6/2012 and 6/2013 which demonstrated ductal dilatation in the right lobe. SERA was added to see if this would improve her liver enzymes. She is currently being treated with Cellcept 500 mg qd and SERA 900 mg daily with good toleration. Her liver transaminases have remained completely normal since SREA was added in 6/2013. Her Cellcept was decreased from BID to daily but unsure when this was completed. FibroScan was performed at our last office visit in April 2019 and demonstrated no fibrosis. She continues to have arthralgias and fatigue due to her psoriatic arthritis.  She regularly sees rheumatology to manage these symptoms. Her medications are currently being adjusted to improve her symptoms. She had a sudden loss of her brother in September 2019. She has not been sleeping well or feeling well. She is having some intermittent RUQ pain. No nausea, vomiting, jaundice. She states having trouble losing weight and has been doing intermittent fasting. Has lost about 20 pounds over a year. Most recent blood work in December 2018 demonstrated normal liver enzymes and function. The patient completes all daily activities without any functional limitations. She continues to work full-time. She denies changes in bowel habits, dark urine, myalgias, arthralgias or pruritus. ALLERGIES:  Allergies   Allergen Reactions    Ciprofloxacin Itching    Codeine Itching    Demerol [Meperidine] Itching    Sulfa (Sulfonamide Antibiotics) Itching    Ultram [Tramadol] Itching    Vicodin [Hydrocodone-Acetaminophen] Itching     MEDICATION:  Current Outpatient Medications   Medication Sig    ursodiol (ACTIGALL) 300 mg capsule TAKE 3 CAPSULES BY MOUTH EVERY DAY    mycophenolate (CELLCEPT) 500 mg tablet TAKE 1 TABLET BY MOUTH daily  Indications: autoimmune hepatitis    ustekinumab (STELARA) 45 mg/0.5 mL injection 45mg SQ week 0, 4 and then q12w    clobetasol (OLUX) 0.05 % topical foam Apply  to affected area two (2) times a day. use thin film on affected area    medroxyPROGESTERone (DEPO-PROVERA) 150 mg/mL injection      Current Facility-Administered Medications   Medication    ustekinumab (STELARA) injection 45 mg     SYSTEM REVIEW NOT RELATED TO LIVER DISEASE OR REVIEWED ABOVE:  Constitution systems: Negative for fever, chills, weight gain, weight loss. Eyes: Negative for visual changes. ENT: Negative for sore throat, painful swallowing. Respiratory: Negative for cough, hemoptysis, SOB. Cardiology: Negative for chest pain, palpitations. GI:  Negative for constipation or diarrhea.   : Negative for urinary frequency, dysuria, hematuria, nocturia. Skin: Negative for rash. Hematology: Negative for easy bruising, blood clots. Musculo-skeletal: Negative for back pain, muscle pain, weakness. Neurologic: Negative for headaches, dizziness, vertigo, memory problems not related to HE. Psychology: Negative for anxiety, depression. FAMILY/SOCIAL HISTORY:  The patient is . There is 1 child. The patient has never used tobacco products. The patient  consumes alcohol on social occasions rarely in excess. 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 PHYSICAL EXAMINATION:  Visit Vitals  /79 (BP 1 Location: Left arm, BP Patient Position: Sitting)   Pulse 78   Temp 97.8 °F (36.6 °C) (Tympanic)   Wt 156 lb (70.8 kg)   SpO2 97%   BMI 25.18 kg/m²     General: No acute distress. Eyes: Sclera anicteric. ENT: No oral lesions. Nodes: No adenopathy. Skin: No spider angiomata. No jaundice. No palmar erythema. Respiratory: Lungs clear to auscultation. Cardiovascular: Regular heart rate. No murmurs. No JVD. Abdomen: Soft non-tender. Liver size normal to percussion/palpation. Spleen not palpable. No obvious ascites. Extremities: No edema. No muscle wasting. No gross arthritic changes. Neurologic: Alert and oriented. Cranial nerves grossly intact. No asterixis.     LABORATORY STUDIES:  Liver [place] of [address] Units 4/16/2019 12/14/2018   WBC 3.4 - 10.8 x10E3/uL 8.0 7.4   ANC 1.4 - 7.0 x10E3/uL  4.4   HGB 11.1 - 15.9 g/dL 14.0 14.1    - 379 x10E3/uL 270 278   AST 0 - 40 IU/L 11 15   ALT 0 - 32 IU/L 12 13   Alk Phos 39 - 117 IU/L 43 48   Bili, Total 0.0 - 1.2 mg/dL 0.3 0.3   Bili, Direct 0.00 - 0.40 mg/dL 0.10    Albumin 3.5 - 5.5 g/dL 4.5 5.0   BUN 6 - 24 mg/dL 12 13   Creat 0.57 - 1.00 mg/dL 0.66 0.76   Na 134 - 144 mmol/L 140 142   K 3.5 - 5.2 mmol/L 4.6 4.5   Cl 96 - 106 mmol/L 102 105   CO2 20 - 29 mmol/L 24 22   Glucose 65 - 99 mg/dL 95 86 [facility] of [address] [place] [address] Ref Rng & Units 7/10/2018   WBC 3.4 - 10.8 x10E3/uL 6.9   ANC 1.4 - 7.0 x10E3/uL    HGB 11.1 - 15.9 g/dL 14.0    - 379 x10E3/uL 269   AST 0 - 40 IU/L 15   ALT 0 - 32 IU/L 17   Alk Phos 39 - 117 IU/L 44   Bili, Total 0.0 - 1.2 mg/dL 0.4   Bili, Direct 0.00 - 0.40 mg/dL    Albumin 3.5 - 5.5 g/dL 4.8   BUN 6 - 24 mg/dL 13   Creat 0.57 - 1.00 mg/dL 0.70   Na 134 - 144 mmol/L 138   K 3.5 - 5.2 mmol/L 4.2   Cl 96 - 106 mmol/L 100   CO2 20 - 29 mmol/L 23   Glucose 65 - 99 mg/dL 139 (H)     SEROLOGIES:  Serologies Latest Ref Rng 3/29/2012 3/5/2012 12/8/2011   Hep A Ab, Total Negative Negative     Hep B Surface Ag Negative Negative     Hep B Core Ab, Total Negative Negative     Hep B Surface Ab 0.00 - 0.99 Index Value <0.1     Ferritin 13 - 150 ng/mL 95     Iron % Saturation 15 - 55 % 32     HEMAL Ab, Direct Negative   Negative   HEMAL, IFA  Negative     C-ANCA Neg:<1:20 titer <1:20     P-ANCA Neg:<1:20 titer <1:20     ANCA Neg:<1:20 titer <1:20     ASMCA 0 - 19 Units 4     M2 Ab 0.0 - 20.0 Units 4.8     Anti-SLA 0.0 - 20.0 units 3.4     LKM 0.0 - 20.0 Units 2.0     Ceruloplasmin 16.0 - 45.0 mg/dL  56.2 (H)    Alpha-1 antitrypsin level 90 - 200 mg/dL 195       LIVER HISTOLOGY:  4/2019. [facility] EkPa was 4.7. IQR/med 13%. . The results suggested a fibrosis level of F0. The CAP score suggests no evidence of fatty liver. 11/2017. FibroScan performed at [facility]. EkPa was 3.3. Suggested fibrosis level is F0.    5/2012. Reviewed by MLS. Severe active hepatitis with bile duct changes, proliferation and destruction. Bridging fibrosis. Histologically consistent with AIH-PBC overlap syndrome. ENDOSCOPIC PROCEDURES:  Not available or performed    RADIOLOGY:  6/2013. MRI of abdomen.  Several new small subcapsular areas of ductal dilatation with associated non-mass like parenchymal enhancement predominantly within the right hepatic lobe. No signs of fibrosis are demonstrated. No discrete mass lesions are noted. 6/2012. MRI abdomen. Normal appearing liver. No liver mass lesions. No dilated bile ducts. No bile duct strictures. No ascites. 9/2011. Ultrasound of liver. Normal appearing liver. No liver mass lesions. 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 Her liver transaminases remain normal with Cellcept and SERA daily. Directed patient to continue. She is very stable from a liver standpoint. We had discussed repeating MRI but I do not think it is necessary at this point. We discussed need to keep on therapy to maintain remission for a while before attempting to wean further off of medications. I have sent a refill of her Cellcept to her mail order pharmacy. Psoriatic arthritis. Rheumatology is now treating her with Stelara. [address] [address] in 6 months to reassess liver enzymes and repeat FibroScan.      Tony Laird, CHALINO-BC  Liver [place] of [facility] [address] [place], [address] [address]  [address].  [phone]

## 2019-10-17 NOTE — PROGRESS NOTES
Chief Complaint   Patient presents with    Follow-up     1. Have you been to the ER, urgent care clinic since your last visit? Hospitalized since your last visit? No    2. Have you seen or consulted any other health care providers outside of the [address] [place] [address] since your last visit? Include any pap smears or colon screening. No    3 most recent PHQ Screens 4/16/2019   Little interest or pleasure in doing things Not at all   Feeling down, depressed, irritable, or hopeless Not at all   Total Score PHQ 2 0     Abuse Screening Questionnaire 4/16/2019   Do you ever feel afraid of your partner? N   Are you in a relationship with someone who physically or mentally threatens you? N   Is it safe for you to go home?  Y     Learning Assessment 4/16/2019   PRIMARY LEARNER Patient   HIGHEST LEVEL OF EDUCATION - PRIMARY LEARNER  -   BARRIERS PRIMARY LEARNER NONE   CO-LEARNER CAREGIVER No   PRIMARY LANGUAGE ENGLISH   LEARNER PREFERENCE PRIMARY LISTENING   LEARNING SPECIAL TOPICS -   ANSWERED BY patient   RELATIONSHIP SELF     Visit Vitals  /79 (BP 1 Location: Left arm, BP Patient Position: Sitting)   Pulse 78   Temp 97.8 °F (36.6 °C) (Tympanic)   Wt 156 lb (70.8 kg)   SpO2 97%   BMI 25.18 kg/m²

## 2019-10-18 LAB
ALBUMIN SERPL-MCNC: 5.1 G/DL (ref 3.5–5.5)
ALP SERPL-CCNC: 37 IU/L (ref 39–117)
ALT SERPL-CCNC: 11 IU/L (ref 0–32)
AST SERPL-CCNC: 12 IU/L (ref 0–40)
BILIRUB DIRECT SERPL-MCNC: 0.11 MG/DL (ref 0–0.4)
BILIRUB SERPL-MCNC: 0.4 MG/DL (ref 0–1.2)
BUN SERPL-MCNC: 13 MG/DL (ref 6–24)
BUN/CREAT SERPL: 16 (ref 9–23)
CALCIUM SERPL-MCNC: 9.9 MG/DL (ref 8.7–10.2)
CHLORIDE SERPL-SCNC: 100 MMOL/L (ref 96–106)
CO2 SERPL-SCNC: 22 MMOL/L (ref 20–29)
CREAT SERPL-MCNC: 0.79 MG/DL (ref 0.57–1)
ERYTHROCYTE [DISTWIDTH] IN BLOOD BY AUTOMATED COUNT: 11.8 % (ref 12.3–15.4)
GLUCOSE SERPL-MCNC: 84 MG/DL (ref 65–99)
HCT VFR BLD AUTO: 41.7 % (ref 34–46.6)
HGB BLD-MCNC: 14 G/DL (ref 11.1–15.9)
MCH RBC QN AUTO: 32.3 PG (ref 26.6–33)
MCHC RBC AUTO-ENTMCNC: 33.6 G/DL (ref 31.5–35.7)
MCV RBC AUTO: 96 FL (ref 79–97)
PLATELET # BLD AUTO: 297 X10E3/UL (ref 150–450)
POTASSIUM SERPL-SCNC: 4.4 MMOL/L (ref 3.5–5.2)
PROT SERPL-MCNC: 7.3 G/DL (ref 6–8.5)
RBC # BLD AUTO: 4.33 X10E6/UL (ref 3.77–5.28)
SODIUM SERPL-SCNC: 142 MMOL/L (ref 134–144)
WBC # BLD AUTO: 7.2 X10E3/UL (ref 3.4–10.8)

## 2020-03-12 RX ORDER — MYCOPHENOLATE MOFETIL 500 MG/1
TABLET ORAL
Qty: 30 TAB | Refills: 11 | Status: CANCELLED | OUTPATIENT
Start: 2020-03-12

## 2020-03-12 RX ORDER — MYCOPHENOLATE MOFETIL 500 MG/1
TABLET ORAL
Qty: 30 TAB | Refills: 11 | Status: SHIPPED | OUTPATIENT
Start: 2020-03-12 | End: 2020-07-14 | Stop reason: SDUPTHER

## 2020-03-12 RX ORDER — URSODIOL 300 MG/1
CAPSULE ORAL
Qty: 90 CAP | Refills: 11 | Status: CANCELLED | OUTPATIENT
Start: 2020-03-12

## 2020-03-30 ENCOUNTER — DOCUMENTATION ONLY (OUTPATIENT)
Dept: HEMATOLOGY | Age: 50
End: 2020-03-30

## 2020-03-30 DIAGNOSIS — K75.4 AUTOIMMUNE HEPATITIS (HCC): Primary | ICD-10-CM

## 2020-03-30 RX ORDER — MYCOPHENOLATE MOFETIL 500 MG/1
TABLET ORAL
Qty: 90 TAB | Refills: 3 | Status: CANCELLED | COMMUNITY
Start: 2020-03-30

## 2020-07-14 ENCOUNTER — OFFICE VISIT (OUTPATIENT)
Dept: HEMATOLOGY | Age: 50
End: 2020-07-14

## 2020-07-14 VITALS
HEART RATE: 88 BPM | WEIGHT: 160 LBS | SYSTOLIC BLOOD PRESSURE: 128 MMHG | TEMPERATURE: 97.8 F | HEIGHT: 66 IN | DIASTOLIC BLOOD PRESSURE: 84 MMHG | OXYGEN SATURATION: 96 % | RESPIRATION RATE: 16 BRPM | BODY MASS INDEX: 25.71 KG/M2

## 2020-07-14 DIAGNOSIS — K74.3 PRIMARY BILIARY CHOLANGITIS (HCC): Primary | ICD-10-CM

## 2020-07-14 RX ORDER — URSODIOL 300 MG/1
CAPSULE ORAL
Qty: 270 CAP | Refills: 3 | Status: SHIPPED | OUTPATIENT
Start: 2020-07-14 | End: 2021-06-11

## 2020-07-14 RX ORDER — MYCOPHENOLATE MOFETIL 500 MG/1
500 TABLET ORAL 2 TIMES DAILY
Qty: 60 TAB | Refills: 11 | Status: SHIPPED | OUTPATIENT
Start: 2020-07-14 | End: 2021-11-30 | Stop reason: SDUPTHER

## 2020-07-14 NOTE — PROGRESS NOTES
Chief Complaint   Patient presents with    Follow-up         1. Have you been to the ER, urgent care clinic since your last visit? Hospitalized since your last visit? no    2. Have you seen or consulted any other health care providers outside of the [address] [place] [address] since your last visit? Include any pap smears or colon screening.   no

## 2020-07-14 NOTE — PROGRESS NOTES
[address] Hebert MD, FACP, [place] Dheer[facility] Esteban[place]bert, [place]       IVANIA Thomas, [facility]   WILNER Zapata NP        at [address], [address] [place] [address], Hua  [address].     [phone]     FAX: [phone]    at [address], [address], [address]     [phone]     FAX: [phone]     PCP: Kendrick Velazquez MD    Patient Active Problem List   Diagnosis Code    Restless leg syndrome G25.81    S/P laparoscopic cholecystectomy Z90.49    Autoimmune hepatitis ([facility] K75.4    Primary biliary cholangitis ([facility] K74.3    Psoriatic arthritis ([facility] L40.50     Omid Scale returns to the Counts [facility] for monitoring and management of autoimmune hepatitis. The active problem list, all pertinent past medical history, medications, liver histology, radiologic findings and laboratory findings related to the liver disorder were reviewed with the patient. A liver biopsy was performed in 4/2012. This demonstrated severe inflammation and bridging fibrosis. Cellcept and prednisone were initiated and improved her liver enzymes. She has been completely weaned off of prednisone. An MRI and MRCP were performed in 6/2012 and 6/2013 which demonstrated ductal dilatation in the right lobe. SERA was added to see if this would improve her liver enzymes. She is currently being treated with Cellcept 500 mg qd and SERA 900 mg daily with good toleration. Her liver transaminases have remained completely normal since SERA was added in 6/2013. Her Cellcept was decreased from BID to daily but unsure when this was completed. FibroScan was performed at our last office visit in April 2019 and demonstrated no fibrosis. She continues to have arthralgias and fatigue due to her psoriatic arthritis. She regularly sees rheumatology to manage these symptoms. Most recent blood work in December 2018 demonstrated normal liver enzymes and function. The patient completes all daily activities without any functional limitations. She continues to work full-time. She denies changes in bowel habits, dark urine, myalgias, arthralgias or pruritus. ALLERGIES:  Allergies   Allergen Reactions    Ciprofloxacin Itching    Codeine Itching    Demerol [Meperidine] Itching    Sulfa (Sulfonamide Antibiotics) Itching    Ultram [Tramadol] Itching    Vicodin [Hydrocodone-Acetaminophen] Itching     MEDICATION:  Current Outpatient Medications   Medication Sig    mycophenolate (CELLCEPT) 500 mg tablet Take 1 Tab by mouth two (2) times a day. TAKE 1 TABLET BY MOUTH daily  Indications: autoimmune hepatitis    ursodioL (ACTIGALL) 300 mg capsule TAKE 3 CAPSULES BY MOUTH EVERY DAY    clobetasol (OLUX) 0.05 % topical foam Apply  to affected area two (2) times a day. use thin film on affected area    medroxyPROGESTERone (DEPO-PROVERA) 150 mg/mL injection      Current Facility-Administered Medications   Medication    ustekinumab (STELARA) injection 45 mg     SYSTEM REVIEW NOT RELATED TO LIVER DISEASE OR REVIEWED ABOVE:  Constitution systems: Negative for fever, chills, weight gain, weight loss. Eyes: Negative for visual changes. ENT: Negative for sore throat, painful swallowing. Respiratory: Negative for cough, hemoptysis, SOB. Cardiology: Negative for chest pain, palpitations. GI:  Negative for constipation or diarrhea. : Negative for urinary frequency, dysuria, hematuria, nocturia. Skin: Negative for rash. Hematology: Negative for easy bruising, blood clots. Musculo-skeletal: Negative for back pain, muscle pain, weakness. Neurologic: Negative for headaches, dizziness, vertigo, memory problems not related to HE. Psychology: Negative for anxiety, depression. FAMILY/SOCIAL HISTORY:  The patient is . There is 1 child.     The patient has never used tobacco products. The patient  consumes alcohol on social occasions rarely in excess. 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 PHYSICAL EXAMINATION:  Visit Vitals  /84   Pulse 88   Temp 97.8 °F (36.6 °C) (Temporal)   Resp 16   Ht 5' 6\" (1.676 m)   Wt 160 lb (72.6 kg)   SpO2 96%   BMI 25.82 kg/m²     General: No acute distress. Eyes: Sclera anicteric. ENT: No oral lesions. Nodes: No adenopathy. Skin: No spider angiomata. No jaundice. No palmar erythema. Respiratory: Lungs clear to auscultation. Cardiovascular: Regular heart rate. No murmurs. No JVD. Abdomen: Soft non-tender. Liver size normal to percussion/palpation. Spleen not palpable. No obvious ascites. Extremities: No edema. No muscle wasting. No gross arthritic changes. Neurologic: Alert and oriented. Cranial nerves grossly intact. No asterixis.     LABORATORY STUDIES:  [facility] of [address] Units 10/17/2019 4/16/2019   WBC 3.4 - 10.8 x10E3/uL 7.2 8.0   ANC 1.4 - 7.0 x10E3/uL     HGB 11.1 - 15.9 g/dL 14.0 14.0    - 450 x10E3/uL 297 270   AST 0 - 40 IU/L 12 11   ALT 0 - 32 IU/L 11 12   Alk Phos 39 - 117 IU/L 37 (L) 43   Bili, Total 0.0 - 1.2 mg/dL 0.4 0.3   Bili, Direct 0.00 - 0.40 mg/dL 0.11 0.10   Albumin 3.8 - 4.8 g/dL 5.1 4.5   BUN 6 - 24 mg/dL 13 12   Creat 0.57 - 1.00 mg/dL 0.79 0.66   Na 134 - 144 mmol/L 142 140   K 3.5 - 5.2 mmol/L 4.4 4.6   Cl 96 - 106 mmol/L 100 102   CO2 20 - 29 mmol/L 22 24   Glucose 65 - 99 mg/dL 84 95     [facility] [place] of [address] [place] [address] Ref Rng & Units 12/14/2018 7/10/2018   WBC 3.4 - 10.8 x10E3/uL 7.4 6.9   ANC 1.4 - 7.0 x10E3/uL 4.4    HGB 11.1 - 15.9 g/dL 14.1 14.0    - 450 x10E3/uL 278 269   AST 0 - 40 IU/L 15 15   ALT 0 - 32 IU/L 13 17   Alk Phos 39 - 117 IU/L 48 44   Bili, Total 0.0 - 1.2 mg/dL 0.3 0.4   Bili, Direct 0.00 - 0.40 mg/dL     Albumin 3.8 - 4.8 g/dL 5.0 4.8   BUN 6 - 24 mg/dL 13 13   Creat 0.57 - 1.00 mg/dL 0.76 0.70   Na 134 - 144 mmol/L 142 138   K 3.5 - 5.2 mmol/L 4.5 4.2   Cl 96 - 106 mmol/L 105 100   CO2 20 - 29 mmol/L 22 23   Glucose 65 - 99 mg/dL 86 139 (H)     Last time the LFTs were elevated:  Liver [place] of [address] Units 6/4/2013 5/1/2013 4/10/2013   WBC 4.0 - 10.5 x10E3/uL      WBC 3.4 - 10.8 x10E3/uL 9.7 8.7 8.7   ANC 1.4 - 7.0 x10E3/uL      HGB 11.1 - 15.9 g/dL 13.2 12.7 12.6    - 450 x10E3/uL 264 255 258   AST 0 - 40 IU/L 67 (H) 69 (H) 88 (H)   ALT 0 - 32 IU/L 102 (H) 106 (H) 119 (H)   Alk Phos 39 - 117 IU/L 185 (H) 197 (H) 233 (H)   Bili, Total 0.0 - 1.2 mg/dL 0.5 0.4 0.4   Bili, Direct 0.00 - 0.40 mg/dL 0.17 0.16 0.16   Albumin 3.8 - 4.8 g/dL 4.5 4.5 4.5   BUN 6 - 24 mg/dL 16 13 21   Creat 0.57 - 1.00 mg/dL 0.75 0.73 0.74   Na 134 - 144 mmol/L 139 140 139   K 3.5 - 5.2 mmol/L 4.3 3.7 4.2   Cl 96 - 106 mmol/L 100 103 102   CO2 20 - 29 mmol/L 22 23 22   Glucose 65 - 99 mg/dL 101 (H) 82 95     SEROLOGIES:  Serologies Latest Ref Rng 3/29/2012 3/5/2012 12/8/2011   Hep A Ab, Total Negative Negative     Hep B Surface Ag Negative Negative     Hep B Core Ab, Total Negative Negative     Hep B Surface Ab 0.00 - 0.99 Index Value <0.1     Ferritin 13 - 150 ng/mL 95     Iron % Saturation 15 - 55 % 32     HEMAL Ab, Direct Negative   Negative   HEMAL, IFA  Negative     C-ANCA Neg:<1:20 titer <1:20     P-ANCA Neg:<1:20 titer <1:20     ANCA Neg:<1:20 titer <1:20     ASMCA 0 - 19 Units 4     M2 Ab 0.0 - 20.0 Units 4.8     Anti-SLA 0.0 - 20.0 units 3.4     LKM 0.0 - 20.0 Units 2.0     Ceruloplasmin 16.0 - 45.0 mg/dL  56.2 (H)    Alpha-1 antitrypsin level 90 - 200 mg/dL 195       LIVER HISTOLOGY:  7/2020. FibroScan performed at [address]. EkPa was 2.8. IQR/med 11%. . The results suggested a fibrosis level of F0. The CAP score suggests no evidence of fatty liver. 4/2019. FibroScan performed at [address]. EkPa was 4.7. IQR/med 13%. .  The results suggested a fibrosis level of F0. The CAP score suggests no evidence of fatty liver. 11/2017. [facility]. EkPa was 3.3. Suggested fibrosis level is F0.    5/2012. Reviewed by MLS. Severe active hepatitis with bile duct changes, proliferation and destruction. Bridging fibrosis. Histologically consistent with AIH-PBC overlap syndrome. ENDOSCOPIC PROCEDURES:  Not available or performed    RADIOLOGY:  6/2013. MRI of abdomen. Several new small subcapsular areas of ductal dilatation with associated non-mass like parenchymal enhancement predominantly within the right hepatic lobe. No signs of fibrosis are demonstrated. No discrete mass lesions are noted. 6/2012. MRI abdomen. Normal appearing liver. No liver mass lesions. No dilated bile ducts. No bile duct strictures. No ascites. 9/2011. Ultrasound of liver. Normal appearing liver. No liver mass lesions. OTHER TESTING:  Not available or performed    ASSESSMENT AND PLAN:  Autoimmune hepatitis/PBC overlap with no fibrosis on FibroScan. Her liver transaminases remain normal with Cellcept and SERA daily. Directed patient to continue. She is very stable from a liver standpoint. We had discussed repeating MRI but I do not think it is necessary at this point. We discussed need to keep on therapy to maintain remission for a while before attempting to wean further off of medications. I have sent a refill of her Cellcept and SERA to her mail order pharmacy. Repeat FibroScan annually. Psoriatic arthritis. Safia Vora stopped working for her for this diagnosis. Follow up. [address] [address] in 6 months to reassess liver enzymes. Repeat FibroScan annually.     CHALINO Webster-BC  Liver [place] [address], [address] [place]  [address].  [phone]

## 2020-07-15 LAB
ALBUMIN SERPL-MCNC: 4.7 G/DL (ref 3.8–4.8)
ALP SERPL-CCNC: 40 IU/L (ref 39–117)
ALT SERPL-CCNC: 11 IU/L (ref 0–32)
AST SERPL-CCNC: 14 IU/L (ref 0–40)
BILIRUB DIRECT SERPL-MCNC: 0.11 MG/DL (ref 0–0.4)
BILIRUB SERPL-MCNC: 0.3 MG/DL (ref 0–1.2)
BUN SERPL-MCNC: 13 MG/DL (ref 6–24)
BUN/CREAT SERPL: 19 (ref 9–23)
CALCIUM SERPL-MCNC: 9.3 MG/DL (ref 8.7–10.2)
CHLORIDE SERPL-SCNC: 101 MMOL/L (ref 96–106)
CO2 SERPL-SCNC: 23 MMOL/L (ref 20–29)
CREAT SERPL-MCNC: 0.69 MG/DL (ref 0.57–1)
ERYTHROCYTE [DISTWIDTH] IN BLOOD BY AUTOMATED COUNT: 11.7 % (ref 11.7–15.4)
GLUCOSE SERPL-MCNC: 83 MG/DL (ref 65–99)
HCT VFR BLD AUTO: 41.1 % (ref 34–46.6)
HGB BLD-MCNC: 13.6 G/DL (ref 11.1–15.9)
MCH RBC QN AUTO: 31.6 PG (ref 26.6–33)
MCHC RBC AUTO-ENTMCNC: 33.1 G/DL (ref 31.5–35.7)
MCV RBC AUTO: 95 FL (ref 79–97)
PLATELET # BLD AUTO: 278 X10E3/UL (ref 150–450)
POTASSIUM SERPL-SCNC: 4.1 MMOL/L (ref 3.5–5.2)
PROT SERPL-MCNC: 7 G/DL (ref 6–8.5)
RBC # BLD AUTO: 4.31 X10E6/UL (ref 3.77–5.28)
SODIUM SERPL-SCNC: 139 MMOL/L (ref 134–144)
WBC # BLD AUTO: 7.7 X10E3/UL (ref 3.4–10.8)

## 2020-09-29 ENCOUNTER — DOCUMENTATION ONLY (OUTPATIENT)
Dept: HEMATOLOGY | Age: 50
End: 2020-09-29

## 2020-09-29 NOTE — PROGRESS NOTES
Received fax from [facility] [place] saying issue getting in touch with patient for cellcept. I called pt. She answered immediately. She had an issue getting medication but now has it. Not sure what the fax is about.

## 2021-06-11 RX ORDER — URSODIOL 300 MG/1
CAPSULE ORAL
Qty: 270 CAPSULE | Refills: 3 | Status: SHIPPED | OUTPATIENT
Start: 2021-06-11 | End: 2022-04-27 | Stop reason: SDUPTHER

## 2021-12-05 RX ORDER — MYCOPHENOLATE MOFETIL 500 MG/1
500 TABLET ORAL 2 TIMES DAILY
Qty: 60 TABLET | Refills: 11 | Status: SHIPPED | OUTPATIENT
Start: 2021-12-05 | End: 2021-12-07 | Stop reason: SDUPTHER

## 2021-12-07 RX ORDER — MYCOPHENOLATE MOFETIL 500 MG/1
500 TABLET ORAL DAILY
Qty: 90 TABLET | Refills: 3 | Status: SHIPPED | OUTPATIENT
Start: 2021-12-07 | End: 2022-10-14 | Stop reason: SDUPTHER

## 2021-12-17 ENCOUNTER — TELEPHONE (OUTPATIENT)
Dept: INTERNAL MEDICINE CLINIC | Age: 51
End: 2021-12-17

## 2021-12-17 ENCOUNTER — OFFICE VISIT (OUTPATIENT)
Dept: URGENT CARE | Age: 51
End: 2021-12-17
Payer: COMMERCIAL

## 2021-12-17 VITALS
HEIGHT: 66 IN | BODY MASS INDEX: 25.71 KG/M2 | TEMPERATURE: 99 F | OXYGEN SATURATION: 95 % | RESPIRATION RATE: 18 BRPM | WEIGHT: 160 LBS | HEART RATE: 63 BPM

## 2021-12-17 DIAGNOSIS — R43.2 LOSS OF TASTE: ICD-10-CM

## 2021-12-17 DIAGNOSIS — Z11.59 SCREENING FOR VIRAL DISEASE: ICD-10-CM

## 2021-12-17 DIAGNOSIS — U07.1 COVID-19: Primary | ICD-10-CM

## 2021-12-17 DIAGNOSIS — R19.7 DIARRHEA, UNSPECIFIED TYPE: ICD-10-CM

## 2021-12-17 DIAGNOSIS — R11.2 NAUSEA AND VOMITING, INTRACTABILITY OF VOMITING NOT SPECIFIED, UNSPECIFIED VOMITING TYPE: ICD-10-CM

## 2021-12-17 DIAGNOSIS — R43.0 LOSS OF SMELL: ICD-10-CM

## 2021-12-17 LAB — SARS-COV-2 POC: POSITIVE

## 2021-12-17 PROCEDURE — 87426 SARSCOV CORONAVIRUS AG IA: CPT | Performed by: FAMILY MEDICINE

## 2021-12-17 PROCEDURE — 99203 OFFICE O/P NEW LOW 30 MIN: CPT | Performed by: FAMILY MEDICINE

## 2021-12-17 RX ORDER — ONDANSETRON 4 MG/1
4 TABLET, ORALLY DISINTEGRATING ORAL
Qty: 10 TABLET | Refills: 0 | Status: SHIPPED | OUTPATIENT
Start: 2021-12-17 | End: 2022-02-23

## 2021-12-17 NOTE — PROGRESS NOTES
This patient was seen at [address] [place] [address] Urgent Care while in their vehicle due to COVID-19 pandemic with PPE and focused examination in order to decrease community viral transmission. The patient/guardian gave verbal consent to treat. Janine Loja is a 49 y.o. female who presents with chills, body aches that started 6 days ago, has had N/V/D as well now with loss of smell and taste today. Took home COVID test which was negative 2 days ago. Was able to keep fluids down today. Last had vomiting and diarrhea yesterday. Denies fever, SOB. Has autoimmune hepatitis on Stelara and psoriatic arthritis. The history is provided by the patient.         Past Medical History:   Diagnosis Date    Abnormal Pap smear     h/o cryo    Autoimmune hepatitis ([facility] [address] 6/10/2012    Cholestasis of pregnancy     Elevated LFTs     Herpes simplex without mention of complication     not on Valtrex now (4/26/2012)    PBC (primary biliary cirrhosis)     Psoriatic arthritis ([facility]     Scalp psoriasis     Sicca syndrome, Sjogren's ([facility]         Past Surgical History:   Procedure Laterality Date    HX CHOLECYSTECTOMY      age 20's   [de-identified] OTHER SURGICAL      cholecystectomy         Family History   Problem Relation Age of Onset    Thyroid Disease Father 54        hypothyroidism    High Cholesterol Mother     Cancer Paternal Grandmother         brca, age 63's   Cardona Heart Disease Maternal Grandmother     No Known Problems Brother     Suicide Maternal Grandfather     Heart Disease Brother         age 64    Cancer Paternal Aunt         pancreatic cancer        Social History     Socioeconomic History    Marital status:      Spouse name: Not on file    Number of children: 1    Years of education: Not on file    Highest education level: Not on file   Occupational History    Occupation: previous owner of two [employer]     Employer: NOT EMPLOYED   Tobacco Use    Smoking status: Never Smoker    Smokeless tobacco: Never Used   Substance and Sexual Activity    Alcohol use: Yes     Alcohol/week: 1.0 standard drink     Types: 1 Glasses of wine per week     Comment: rare    Drug use: No    Sexual activity: Yes     Partners: Male     Birth control/protection: Injection   Other Topics Concern    Not on file   Social History Narrative    Not on file     Social Determinants of Health     Financial Resource Strain:     Difficulty of Paying Living Expenses: Not on file   Food Insecurity:     Worried About Running Out of Food in the Last Year: Not on file    Ronald of Food in the Last Year: Not on file   Transportation Needs:     Lack of Transportation (Medical): Not on file    Lack of Transportation (Non-Medical): Not on file   Physical Activity:     Days of Exercise per Week: Not on file    Minutes of Exercise per Session: Not on file   Stress:     Feeling of Stress : Not on file   Social Connections:     Frequency of Communication with Friends and Family: Not on file    Frequency of Social Gatherings with Friends and Family: Not on file    Attends Christian Services: Not on file    Active Member of [address] [place] [address] [employer] or Organizations: Not on file    Attends Club or Organization Meetings: Not on file    Marital Status: Not on file   Intimate Partner Violence:     Fear of Current or Ex-Partner: Not on file    Emotionally Abused: Not on file    Physically Abused: Not on file    Sexually Abused: Not on file   Housing Stability:     Unable to Pay for Housing in the Last Year: Not on file    Number of [place] in the Last Year: Not on file    Unstable Housing in the Last Year: Not on file                ALLERGIES: Ciprofloxacin, Codeine, Demerol [meperidine], Sulfa (sulfonamide antibiotics), Ultram [tramadol], and Vicodin [hydrocodone-acetaminophen]    Review of Systems   Constitutional: Positive for appetite change. Negative for fever. Respiratory: Positive for cough. Negative for shortness of breath. Gastrointestinal: Positive for diarrhea, nausea and vomiting. Vitals:    12/17/21 1248 12/17/21 1300   Pulse: 63    Resp: 18    Temp: 99 °F (37.2 °C)    SpO2: 95%    Weight:  160 lb (72.6 kg)   Height:  5' 6\" (1.676 m)       Physical Exam  Vitals and nursing note reviewed. Constitutional:       General: She is not in acute distress. Appearance: She is well-developed. She is not diaphoretic. Pulmonary:      Effort: Pulmonary effort is normal. No respiratory distress. Breath sounds: Normal breath sounds. No stridor. No wheezing, rhonchi or rales. Neurological:      Mental Status: She is alert. Psychiatric:         Behavior: Behavior normal.         Thought Content: Thought content normal.         Judgment: Judgment normal.         MDM    ICD-10-CM ICD-9-CM   1. COVID-19  U07.1 079.89   2. Nausea and vomiting, intractability of vomiting not specified, unspecified vomiting type  R11.2 787.01   3. Diarrhea, unspecified type  R19.7 787.91   4. Loss of taste  R43.2 781.1   5. Loss of smell  R43.0 781.1   6. Screening for viral disease  Z11.59 V73.99       Orders Placed This Encounter    AMB POC SARS-COV-2 ANTIGEN     Order Specific Question:   Is this test for diagnosis or screening? Answer:   Diagnosis of ill patient     Order Specific Question:   Symptomatic for COVID-19 as defined by CDC? Answer:   Yes     Order Specific Question:   Date of Symptom Onset     Answer:   12/11/2021     Order Specific Question:   Hospitalized for COVID-19? Answer:   No     Order Specific Question:   Admitted to ICU for COVID-19? Answer:   No     Order Specific Question:   Employed in healthcare setting? Answer:   Unknown     Order Specific Question:   Resident in a congregate (group) care setting? Answer:   No     Order Specific Question:   Pregnant? Answer:   No     Order Specific Question:   Previously tested for COVID-19?      Answer:   Unknown    ondansetron (ZOFRAN ODT) 4 mg disintegrating tablet     Sig: Take 1 Tablet by mouth every eight (8) hours as needed for Nausea. Dispense:  10 Tablet     Refill:  0      Referred for Monoclonal Infusion  Quarantine x 10 days from sx onset  Deep breathing exercises, ambulation  Tylenol prn  Increase fluids with electrolytes if decreased PO intake    If signs and symptoms become worse the pt is to go to the ER.      Results for orders placed or performed in visit on 12/17/21   AMB POC SARS-COV-2   Result Value Ref Range    SARS-COV-2 POC Positive (A) Negative       Procedures

## 2021-12-17 NOTE — TELEPHONE ENCOUNTER
#[phone] Serge Lopez states there is a form in My Chart that needs to be filled out so pt can get an infusion. He states this is very important as pt is very sick. Serge Lopez needs a call back right away about this.

## 2021-12-17 NOTE — TELEPHONE ENCOUNTER
----- Message from Danielle Green sent at 12/17/2021  7:35 AM EST -----  Subject: Message to Provider    QUESTIONS  Information for Provider? - Carolyn Alfaro called requesting   paperwork filled out for her asap. The pt is on day 5 of covid, she tested   positive for a home rapid test. The pt take immunosuppressants and is not   vaccinated for covid. There's paperwork in her mychart that he's   requesting Dr. Ganesh Awan to fill out so the pt is able to receive   infusions. Please call with information / locations that she's able to go   to.   ---------------------------------------------------------------------------  --------------  [address]  What is the best way for the office to contact you? OK to leave message on   voicemail  Preferred Call Back Phone Number? [phone]  ---------------------------------------------------------------------------  --------------  SCRIPT ANSWERS  Relationship to Patient? Other  Representative Name? cl Denton  Is the Representative on the appropriate HIPAA document in Epic?  Yes

## 2022-02-22 NOTE — PROGRESS NOTES
[address] [facility], MD, [address], [place], [place]      IVANIA Sheridan, [facility]-BC     April S Kirsten, [facility]   CHRISSY Dunn-JACQUE Bates, [facility]       Cristino Martinez Kayden [address]    at [facility]    [address], [address] [address]  22.    [phone]    FAX: [address] [place] [address] Avenue    [address], [address], [address]    [phone]    FAX: [phone]     PCP: Justo Barnhart MD    Patient Active Problem List   Diagnosis Code    Restless leg syndrome G25.81    S/P laparoscopic cholecystectomy Z90.49    Autoimmune hepatitis ([facility] K75.4    Primary biliary cholangitis ([facility] K74.3    Psoriatic arthritis ([facility] L40.50     Lawrence Guan is a 49 y.o. female, evaluated via audio-only technology on 2/23/2022 for No chief complaint on file. Assessment & Plan:   AIH: continue 500 mg every day cellcept  PBC overlap: continue SERA  12  Subjective:     Doesn't feel well today. Running a fever. Prior to Admission medications    Medication Sig Start Date End Date Taking? Authorizing Provider   ondansetron (ZOFRAN ODT) 4 mg disintegrating tablet Take 1 Tablet by mouth every eight (8) hours as needed for Nausea. 12/17/21   Olivia Wu MD   mycophenolate (CELLCEPT) 500 mg tablet Take 1 Tablet by mouth daily. Indications: liver inflammation resulting from an abnormal immune response, autoimmune hepatitis 12/7/21   Em Chavarria.WILNER   ursodioL (ACTIGALL) 300 mg capsule TAKE 3 CAPSULES BY MOUTH  EVERY DAY 6/11/21   Em Chavarria., WILNER   clobetasol (OLUX) 0.05 % topical foam Apply  to affected area two (2) times a day.  use thin film on affected area  Patient not taking: Reported on 12/17/2021 1/11/19   Martín Carrera MD   medroxyPROGESTERone (DEPO-PROVERA) 150 mg/mL injection  5/31/16   Provider, Historical         ROS    No data recorded     Kristin Griffin, who was evaluated through a patient-initiated, synchronous (real-time) audio only encounter, and/or her healthcare decision maker, is aware that it is a billable service, which includes applicable co-pays, with coverage as determined by her insurance carrier. She provided verbal consent to proceed. She has not had a related appointment within my department in the past 7 days or scheduled within the next 24 hours. The patient was located at home in a state where the provider was licensed to provide care. Magalie Smith NP       Kristin Griffin returns to the [address] for monitoring and management of autoimmune hepatitis. The active problem list, all pertinent past medical history, medications, liver histology, radiologic findings and laboratory findings related to the liver disorder were reviewed with the patient. A liver biopsy was performed in 4/2012. This demonstrated severe inflammation and bridging fibrosis. Cellcept and prednisone were initiated and improved her liver enzymes. She has been completely weaned off of prednisone. An MRI and MRCP were performed in 6/2012 and 6/2013 which demonstrated ductal dilatation in the right lobe. SERA was added to see if this would improve her liver enzymes. She is currently being treated with Cellcept 500 mg qd and SERA 900 mg daily with good toleration. Her liver transaminases have remained completely normal since SERA was added in 6/2013. Her Cellcept was decreased from BID to daily but unsure when this was completed. She continues to have arthralgias and fatigue due to her psoriatic arthritis. She regularly sees rheumatology to manage these symptoms. The patient completes all daily activities without any functional limitations. She denies changes in bowel habits, dark urine, myalgias, arthralgias or pruritus. She had COVID in December 2021. She had malaise this morning and  her temperature and she found she had a fever so her visit was changed to phone only. She is having occasional pangs of discomfort in RUQ but nothing sustained or severe. We discussed symptoms of AIH flare. 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 Her liver transaminases remain normal with Cellcept and SERA daily. Directed patient to continue. She is very stable from a liver standpoint. We had discussed repeating MRI but I do not think it is necessary at this point. We discussed need to keep on therapy to maintain remission for a while before attempting to wean further off of medications. I have sent a refill of her Cellcept and SERA to her mail order pharmacy. Repeat FibroScan annually. Psoriatic arthritis. Annmarieterence Adelaida stopped working for her for this diagnosis. ALLERGIES:  Allergies   Allergen Reactions    Ciprofloxacin Itching    Codeine Itching    Demerol [Meperidine] Itching    Sulfa (Sulfonamide Antibiotics) Itching    Ultram [Tramadol] Itching    Vicodin [Hydrocodone-Acetaminophen] Itching     MEDICATION:  Current Outpatient Medications   Medication Sig    ondansetron (ZOFRAN ODT) 4 mg disintegrating tablet Take 1 Tablet by mouth every eight (8) hours as needed for Nausea.  mycophenolate (CELLCEPT) 500 mg tablet Take 1 Tablet by mouth daily. Indications: liver inflammation resulting from an abnormal immune response, autoimmune hepatitis    ursodioL (ACTIGALL) 300 mg capsule TAKE 3 CAPSULES BY MOUTH  EVERY DAY    clobetasol (OLUX) 0.05 % topical foam Apply  to affected area two (2) times a day.  use thin film on affected area (Patient not taking: Reported on 12/17/2021)    medroxyPROGESTERone (DEPO-PROVERA) 150 mg/mL injection      Current Facility-Administered Medications   Medication    ustekinumab (STELARA) injection 45 mg     FAMILY/SOCIAL HISTORY:  The patient is . There is 1 child. The patient has never used tobacco products. The patient  consumes alcohol on social occasions rarely in excess. 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     PHYSICAL EXAMINATION:  No physical exam    LABORATORY STUDIES:  Liver [place] of [address] [place] [address] Units 7/14/2020 10/17/2019   WBC 3.4 - 10.8 x10E3/uL 7.7 7.2   ANC 1.4 - 7.0 x10E3/uL     HGB 11.1 - 15.9 g/dL 13.6 14.0    - 450 x10E3/uL 278 297   AST 0 - 40 IU/L 14 12   ALT 0 - 32 IU/L 11 11   Alk Phos 39 - 117 IU/L 40 37 (L)   Bili, Total 0.0 - 1.2 mg/dL 0.3 0.4   Bili, Direct 0.00 - 0.40 mg/dL 0.11 0.11   Albumin 3.8 - 4.8 g/dL 4.7 5.1   BUN 6 - 24 mg/dL 13 13   Creat 0.57 - 1.00 mg/dL 0.69 0.79   Na 134 - 144 mmol/L 139 142   K 3.5 - 5.2 mmol/L 4.1 4.4   Cl 96 - 106 mmol/L 101 100   CO2 20 - 29 mmol/L 23 22   Glucose 65 - 99 mg/dL 83 84     Last time the LFTs were elevated:  Liver [place] of [address] [place] [address] Ref Rng & Units 6/4/2013 5/1/2013 4/10/2013   WBC 4.0 - 10.5 x10E3/uL      WBC 3.4 - 10.8 x10E3/uL 9.7 8.7 8.7   ANC 1.4 - 7.0 x10E3/uL      HGB 11.1 - 15.9 g/dL 13.2 12.7 12.6    - 450 x10E3/uL 264 255 258   AST 0 - 40 IU/L 67 (H) 69 (H) 88 (H)   ALT 0 - 32 IU/L 102 (H) 106 (H) 119 (H)   Alk Phos 39 - 117 IU/L 185 (H) 197 (H) 233 (H)   Bili, Total 0.0 - 1.2 mg/dL 0.5 0.4 0.4   Bili, Direct 0.00 - 0.40 mg/dL 0.17 0.16 0.16   Albumin 3.8 - 4.8 g/dL 4.5 4.5 4.5   BUN 6 - 24 mg/dL 16 13 21   Creat 0.57 - 1.00 mg/dL 0.75 0.73 0.74   Na 134 - 144 mmol/L 139 140 139   K 3.5 - 5.2 mmol/L 4.3 3.7 4.2   Cl 96 - 106 mmol/L 100 103 102   CO2 20 - 29 mmol/L 22 23 22   Glucose 65 - 99 mg/dL 101 (H) 82 95     SEROLOGIES:  Serologies Latest Ref Rng 3/29/2012 3/5/2012 12/8/2011   Hep A Ab, Total Negative Negative     Hep B Surface Ag Negative Negative     Hep B Core Ab, Total Negative Negative     Hep B Surface Ab 0.00 - 0.99 Index Value <0.1     Ferritin 13 - 150 ng/mL 95     Iron % Saturation 15 - 55 % 32     HEMAL Ab, Direct Negative   Negative   HEMAL, IFA  Negative     C-ANCA Neg:<1:20 titer <1:20     P-ANCA Neg:<1:20 titer <1:20     ANCA Neg:<1:20 titer <1:20     ASMCA 0 - 19 Units 4     M2 Ab 0.0 - 20.0 Units 4.8     Anti-SLA 0.0 - 20.0 units 3.4     LKM 0.0 - 20.0 Units 2.0     Ceruloplasmin 16.0 - 45.0 mg/dL  56.2 (H)    Alpha-1 antitrypsin level 90 - 200 mg/dL 195       LIVER HISTOLOGY:  7/2020. FibroScan performed at [address]. EkPa was 2.8. IQR/med 11%. . The results suggested a fibrosis level of F0. The CAP score suggests no evidence of fatty liver. 4/2019. FibroScan performed at [address]. EkPa was 4.7. IQR/med 13%. . The results suggested a fibrosis level of F0. The CAP score suggests no evidence of fatty liver. 11/2017. FibroScan performed at [address]. EkPa was 3.3. Suggested fibrosis level is F0.    5/2012. Reviewed by MLS. Severe active hepatitis with bile duct changes, proliferation and destruction. Bridging fibrosis. Histologically consistent with AIH-PBC overlap syndrome. ENDOSCOPIC PROCEDURES:  Not available or performed    RADIOLOGY:  6/2013. MRI of abdomen. Several new small subcapsular areas of ductal dilatation with associated non-mass like parenchymal enhancement predominantly within the right hepatic lobe. No signs of fibrosis are demonstrated. No discrete mass lesions are noted. 6/2012. MRI abdomen. Normal appearing liver. No liver mass lesions. No dilated bile ducts. No bile duct strictures. No ascites. 9/2011. Ultrasound of liver. Normal appearing liver. No liver mass lesions. OTHER TESTING:  Not available or performed    FOLLOW-UP:  All of the issues listed above in the assessment and plan were discussed with the patient. All questions were answered. The patient expressed a clear understanding of the above.     [address] [facility] in 3 months. Mail labs today. FS at visit after next.     CHALINO Damon-BC  Liver [place] of [facility] [address] [place], [address] [facility] 22.  [phone]

## 2022-02-23 ENCOUNTER — VIRTUAL VISIT (OUTPATIENT)
Dept: HEMATOLOGY | Age: 52
End: 2022-02-23

## 2022-02-23 DIAGNOSIS — K75.4 AUTOIMMUNE HEPATITIS (HCC): Primary | ICD-10-CM

## 2022-02-23 PROCEDURE — 99443 PR PHYS/QHP TELEPHONE EVALUATION 21-30 MIN: CPT | Performed by: NURSE PRACTITIONER

## 2022-02-23 NOTE — PROGRESS NOTES
Identified pt with two pt identifiers(name and ). Reviewed record in preparation for visit and have obtained necessary documentation. No chief complaint on file. There were no vitals filed for this visit. Health Maintenance Review: Patient reminded of \"due or due soon\" health maintenance. I have asked the patient to contact his/her primary care provider (PCP) for follow-up on his/her health maintenance. Coordination of Care Questionnaire:  :   1) Have you been to an emergency room, urgent care, or hospitalized since your last visit? If yes, where when, and reason for visit? no       2. Have seen or consulted any other health care provider since your last visit? If yes, where when, and reason for visit? NO      Patient is accompanied by self I have received verbal consent from Carlos Augustine to discuss any/all medical information while they are present in the room.

## 2022-03-18 PROBLEM — L40.50 PSORIATIC ARTHRITIS (HCC): Status: ACTIVE | Noted: 2017-05-24

## 2022-04-27 RX ORDER — URSODIOL 300 MG/1
900 CAPSULE ORAL DAILY
Qty: 270 CAPSULE | Refills: 3 | Status: SHIPPED | OUTPATIENT
Start: 2022-04-27

## 2022-10-14 DIAGNOSIS — K75.4 AUTOIMMUNE HEPATITIS (HCC): Primary | ICD-10-CM

## 2022-10-14 RX ORDER — MYCOPHENOLATE MOFETIL 500 MG/1
500 TABLET ORAL DAILY
Qty: 90 TABLET | Refills: 0 | Status: SHIPPED | OUTPATIENT
Start: 2022-10-14 | End: 2022-10-17 | Stop reason: SDUPTHER

## 2022-10-17 DIAGNOSIS — K75.4 AUTOIMMUNE HEPATITIS (HCC): ICD-10-CM

## 2022-10-17 RX ORDER — MYCOPHENOLATE MOFETIL 500 MG/1
500 TABLET ORAL DAILY
Qty: 90 TABLET | Refills: 2 | Status: SHIPPED | OUTPATIENT
Start: 2022-10-17

## 2023-02-20 ENCOUNTER — OFFICE VISIT (OUTPATIENT)
Dept: HEMATOLOGY | Age: 53
End: 2023-02-20
Payer: COMMERCIAL

## 2023-02-20 VITALS
DIASTOLIC BLOOD PRESSURE: 86 MMHG | BODY MASS INDEX: 27.97 KG/M2 | SYSTOLIC BLOOD PRESSURE: 127 MMHG | HEIGHT: 66 IN | WEIGHT: 174 LBS | OXYGEN SATURATION: 97 % | HEART RATE: 90 BPM | TEMPERATURE: 98.2 F

## 2023-02-20 DIAGNOSIS — K75.4 AUTOIMMUNE HEPATITIS (HCC): Primary | ICD-10-CM

## 2023-02-20 PROCEDURE — 99214 OFFICE O/P EST MOD 30 MIN: CPT | Performed by: PHYSICIAN ASSISTANT

## 2023-02-20 PROCEDURE — 91200 LIVER ELASTOGRAPHY: CPT | Performed by: PHYSICIAN ASSISTANT

## 2023-02-20 NOTE — PROGRESS NOTES
Identified pt with two pt identifiers(name and ). Reviewed record in preparation for visit and have obtained necessary documentation. Chief Complaint   Patient presents with    Other     1yr autoimmune hepatitis follow up      Vitals:    23 1149   BP: 127/86   Pulse: 90   Temp: 98.2 °F (36.8 °C)   TempSrc: Temporal   SpO2: 97%   Weight: 174 lb (78.9 kg)   Height: 5' 6\" (1.676 m)   PainSc:   0 - No pain       Health Maintenance Review: Patient reminded of \"due or due soon\" health maintenance. I have asked the patient to contact his/her primary care provider (PCP) for follow-up on his/her health maintenance. Coordination of Care Questionnaire:  :   1) Have you been to an emergency room, urgent care, or hospitalized since your last visit? If yes, where when, and reason for visit? no       2. Have seen or consulted any other health care provider since your last visit? If yes, where when, and reason for visit? NO      Patient is accompanied by self I have received verbal consent from Johnny Edwards to discuss any/all medical information while they are present in the room.

## 2023-02-20 NOTE — PROGRESS NOTES
[address] [facility], [place], [address], [place], [place]      IVANIA Rodriguez, PCNP-BC   Genia Kinney, [facility]-   Marine Lee, FNBRIAN-JACQUE Dominguez, FNP-C   Vlad Samthuy, AGPCNP-BC      [address]   at [address], [address] [address]  22.   [phone]   FAX: [phone]  Liver [place] of [facility]   at [facility]   [address], [address] [facility], [address]   [phone]   FAX: [phone]     PCP: Ban De Anda MD    Problem List  Date Reviewed: 12/17/2021            Codes Class Noted    Psoriatic arthritis [facility]) ICD-10-CM: L40.50  ICD-9-CM: 696.0  5/24/2017        Primary biliary cholangitis ([facility] ICD-10-CM: K74.3  ICD-9-CM: 571.6  Unknown        Autoimmune hepatitis ([facility] ICD-10-CM: K75.4  ICD-9-CM: 571.42  6/10/2012        S/P laparoscopic cholecystectomy ICD-10-CM: Z90.49  ICD-9-CM: V45.89  3/29/2012    Overview Addendum 3/29/2012 11:43 AM by Raphael Pickens MD     1990s             Restless leg syndrome ICD-10-CM: G25.81  ICD-9-CM: 333.94  10/18/2011            Iona Botello returns to the [address] for monitoring and management of autoimmune hepatitis. The active problem list, all pertinent past medical history, medications, liver histology, radiologic findings and laboratory findings related to the liver disorder were reviewed with the patient. A liver biopsy was performed in 4/2012. This demonstrated severe inflammation and bridging fibrosis. Cellcept and prednisone were initiated and improved her liver enzymes. She has been completely weaned off of prednisone in 2012. An MRI and MRCP were performed in 6/2012 and 6/2013 which demonstrated ductal dilatation in the right lobe.  SERA was added to see if this would improve her liver enzymes and she had a dramatically positive response to this medication. She is currently being treated with Cellcept 500 mg qd and SERA 900 mg daily with good toleration. Her liver transaminases have remained completely normal since SERA was added in 6/2013. Her Cellcept was decreased from BID to daily at some point in the past several years, but unsure when this was completed. She thinks that this was reduced in an effort to wean her off of MMF but she never went below 500 mg daily on dosing. She continues to have arthralgias and fatigue due to her psoriatic arthritis. She regularly sees rheumatology to manage these symptoms but is not presently on any medication for this as nothing really worked for her in the past.  She occasionally has skin symptoms of the scalp. She has had ongoing frequency urgency of stools, loose stools. Unsure if this is medication related. Current not taking medication or doing anything about these symptoms. The patient completes all daily activities without any functional limitations. She denies changes in dark urine, myalgias, arthralgias or pruritus. She is not sleeping well in general but energy is fair. She is perimenopausal. She remains on Depo-provera. She is having occasional pangs of discomfort in RUQ but nothing sustained or severe - occasionally aching in back and not feeling like muscular. 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 Her liver transaminases remain normal with Cellcept and SERA daily. Directed patient to continue for now pending review of labs. We would like to further reduce the Cellcept if labs remain normal in an effort to get her off of all extraneous medication if indicated. She has had some modest weight gain (~14#) in the past 14 months and Fibroscan shows mild elevation in CAP score. Will keep this in mild if liver enzymes rise on pending labs.      She has had no labs in the past year, we will obtain this today and I have asked her to remain on present medications for the time being. Repeat FibroScan annually. Psoriatic arthritis. Stelara stopped working for her for her symptoms, primarily of joint pain. She is taking no medications for this diagnosis at present. ALLERGIES:  Allergies   Allergen Reactions    Ciprofloxacin Itching    Codeine Itching    Demerol [Meperidine] Itching    Sulfa (Sulfonamide Antibiotics) Itching    Ultram [Tramadol] Itching    Vicodin [Hydrocodone-Acetaminophen] Itching     MEDICATION:  Current Outpatient Medications   Medication Sig    mycophenolate (CELLCEPT) 500 mg tablet Take 1 Tablet by mouth daily. Indications: liver inflammation resulting from an abnormal immune response, autoimmune hepatitis    ursodioL (ACTIGALL) 300 mg capsule Take 3 Capsules by mouth daily. medroxyPROGESTERone (DEPO-PROVERA) 150 mg/mL injection      Current Facility-Administered Medications   Medication    ustekinumab (STELARA) injection 45 mg     FAMILY/SOCIAL HISTORY:  The patient is . There is 1 child. The patient has never used tobacco products. The patient  consumes alcohol on social occasions rarely in excess. 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 PHYSICAL EXAMINATION:  Visit Vitals  /86 (BP 1 Location: Right arm, BP Patient Position: Sitting, BP Cuff Size: Adult)   Pulse 90   Temp 98.2 °F (36.8 °C) (Temporal)   Ht 5' 6\" (1.676 m)   Wt 174 lb (78.9 kg)   SpO2 97%   BMI 28.08 kg/m²   General: No acute distress. Eyes: Sclera anicteric. ENT: No oral lesions. Thyroid normal.  Nodes: No adenopathy. Skin: No spider angiomata. No jaundice. No palmar erythema. Respiratory: Lungs clear to auscultation. Cardiovascular: Regular heart rate. No murmurs. No JVD. Abdomen: Soft non-tender, liver size normal to percussion/palpation. Spleen not palpable. No obvious ascites. Extremities: No edema.   No muscle wasting. No gross arthritic changes. Neurologic: Alert and oriented. Cranial nerves grossly intact. No asterixis.     LABORATORY STUDIES:  Liver [place] [address] & Units 3/21/2022 7/14/2020   WBC 3.6 - 11.0 K/uL 6.3 7.7   HGB 11.5 - 16.0 g/dL 13.6 13.6    - 400 K/uL 269 278   INR 0.9 - 1.1   1.0    AST 15 - 37 U/L 7 (L) 14   ALT 12 - 78 U/L 19 11   Alk Phos 45 - 117 U/L 53 40   Bili, Total 0.2 - 1.0 MG/DL 0.3 0.3   Bili, Direct 0.0 - 0.2 MG/DL <0.1 0.11   Albumin 3.5 - 5.0 g/dL 4.3 4.7   BUN 6 - 20 MG/DL 16 13   Creat 0.55 - 1.02 MG/DL 0.84 0.69   Na 136 - 145 mmol/L 138 139   K 3.5 - 5.1 mmol/L 4.5 4.1   Cl 97 - 108 mmol/L 107 101   CO2 21 - 32 mmol/L 25 23   Glucose 65 - 100 mg/dL 105 (H) 83     Liver [place] [address] Ref Rng & Units 10/17/2019   WBC 3.6 - 11.0 K/uL 7.2   HGB 11.5 - 16.0 g/dL 14.0    - 400 K/uL 297   INR 0.9 - 1.1      AST 15 - 37 U/L 12   ALT 12 - 78 U/L 11   Alk Phos 45 - 117 U/L 37 (L)   Bili, Total 0.2 - 1.0 MG/DL 0.4   Bili, Direct 0.0 - 0.2 MG/DL 0.11   Albumin 3.5 - 5.0 g/dL 5.1   BUN 6 - 20 MG/DL 13   Creat 0.55 - 1.02 MG/DL 0.79   Na 136 - 145 mmol/L 142   K 3.5 - 5.1 mmol/L 4.4   Cl 97 - 108 mmol/L 100   CO2 21 - 32 mmol/L 22   Glucose 65 - 100 mg/dL 84   Last time the LFTs were elevated:  Liver [place] [address] Ref Rng & Units 6/4/2013 5/1/2013 4/10/2013   WBC 4.0 - 10.5 x10E3/uL      WBC 3.4 - 10.8 x10E3/uL 9.7 8.7 8.7   ANC 1.4 - 7.0 x10E3/uL      HGB 11.1 - 15.9 g/dL 13.2 12.7 12.6    - 450 x10E3/uL 264 255 258   AST 0 - 40 IU/L 67 (H) 69 (H) 88 (H)   ALT 0 - 32 IU/L 102 (H) 106 (H) 119 (H)   Alk Phos 39 - 117 IU/L 185 (H) 197 (H) 233 (H)   Bili, Total 0.0 - 1.2 mg/dL 0.5 0.4 0.4   Bili, Direct 0.00 - 0.40 mg/dL 0.17 0.16 0.16   Albumin 3.8 - 4.8 g/dL 4.5 4.5 4.5   BUN 6 - 24 mg/dL 16 13 21   Creat 0.57 - 1.00 mg/dL 0.75 0.73 0.74   Na 134 - 144 mmol/L 139 140 139   K 3.5 - 5.2 mmol/L 4.3 3.7 4.2   Cl 96 - 106 mmol/L 100 103 102   CO2 20 - 29 mmol/L 22 23 22   Glucose 65 - 99 mg/dL 101 (H) 82 95   Additional lab values drawn at today's office visit are pending at the time of documentation. SEROLOGIES:  Serologies Latest Ref Rng 3/29/2012 3/5/2012 12/8/2011   Hep A Ab, Total Negative Negative     Hep B Surface Ag Negative Negative     Hep B Core Ab, Total Negative Negative     Hep B Surface Ab 0.00 - 0.99 Index Value <0.1     Ferritin 13 - 150 ng/mL 95     Iron % Saturation 15 - 55 % 32     HEMAL Ab, Direct Negative   Negative   HEMAL, IFA  Negative     C-ANCA Neg:<1:20 titer <1:20     P-ANCA Neg:<1:20 titer <1:20     ANCA Neg:<1:20 titer <1:20     ASMCA 0 - 19 Units 4     M2 Ab 0.0 - 20.0 Units 4.8     Anti-SLA 0.0 - 20.0 units 3.4     LKM 0.0 - 20.0 Units 2.0     Ceruloplasmin 16.0 - 45.0 mg/dL  56.2 (H)    Alpha-1 antitrypsin level 90 - 200 mg/dL 195       LIVER HISTOLOGY:  2/2023. FibroScan performed at [facility]. EkPa was 4.2. Suggested fibrosis level is F0-1. CAP score of 301, this is consistent with steatosis. 7/2020. FibroScan performed at [facility]. EkPa was 2.8. IQR/med 11%. . The results suggested a fibrosis level of F0. The CAP score suggests no evidence of fatty liver. 4/2019. FibroScan performed at [facility]. EkPa was 4.7. IQR/med 13%. . The results suggested a fibrosis level of F0. The CAP score suggests no evidence of fatty liver. 11/2017. FibroScan performed at [facility]. EkPa was 3.3. Suggested fibrosis level is F0.    5/2012. Reviewed by MLS. Severe active hepatitis with bile duct changes, proliferation and destruction. Bridging fibrosis. Histologically consistent with AIH-PBC overlap syndrome. ENDOSCOPIC PROCEDURES:  Not available or performed    RADIOLOGY:  6/2013. MRI of abdomen.  Several new small subcapsular areas of ductal dilatation with associated non-mass like parenchymal enhancement predominantly within the right hepatic lobe. No signs of fibrosis are demonstrated. No discrete mass lesions are noted. 6/2012. MRI abdomen. Normal appearing liver. No liver mass lesions. No dilated bile ducts. No bile duct strictures. No ascites. 9/2011. Ultrasound of liver. Normal appearing liver. No liver mass lesions. OTHER TESTING:  Not available or performed    FOLLOW-UP:  All of the issues listed above in the assessment and plan were discussed with the patient. All questions were answered. The patient expressed a clear understanding of the above. [address] [place] [address] in 6 months for virtual visit, likely sooner for repeat local labs if reduction in MMF is warranted. Documentation reviewed and updated to reflect current, accurate patient information.     Meme Pardo PA-C  Liver [facility], [address]  22.  [phone]  [address]

## 2023-02-21 LAB
ALBUMIN SERPL-MCNC: 4.2 G/DL (ref 3.5–5)
ALBUMIN/GLOB SERPL: 1.4 (ref 1.1–2.2)
ALP SERPL-CCNC: 53 U/L (ref 45–117)
ALT SERPL-CCNC: 23 U/L (ref 12–78)
ANION GAP SERPL CALC-SCNC: 7 MMOL/L (ref 5–15)
AST SERPL-CCNC: 12 U/L (ref 15–37)
BILIRUB DIRECT SERPL-MCNC: 0.2 MG/DL (ref 0–0.2)
BILIRUB SERPL-MCNC: 0.6 MG/DL (ref 0.2–1)
BUN SERPL-MCNC: 13 MG/DL (ref 6–20)
BUN/CREAT SERPL: 15 (ref 12–20)
CALCIUM SERPL-MCNC: 9.6 MG/DL (ref 8.5–10.1)
CHLORIDE SERPL-SCNC: 107 MMOL/L (ref 97–108)
CO2 SERPL-SCNC: 27 MMOL/L (ref 21–32)
CREAT SERPL-MCNC: 0.88 MG/DL (ref 0.55–1.02)
ERYTHROCYTE [DISTWIDTH] IN BLOOD BY AUTOMATED COUNT: 12 % (ref 11.5–14.5)
GLOBULIN SER CALC-MCNC: 3 G/DL (ref 2–4)
GLUCOSE SERPL-MCNC: 94 MG/DL (ref 65–100)
HCT VFR BLD AUTO: 44.5 % (ref 35–47)
HGB BLD-MCNC: 14.2 G/DL (ref 11.5–16)
MCH RBC QN AUTO: 31.6 PG (ref 26–34)
MCHC RBC AUTO-ENTMCNC: 31.9 G/DL (ref 30–36.5)
MCV RBC AUTO: 98.9 FL (ref 80–99)
NRBC # BLD: 0 K/UL (ref 0–0.01)
NRBC BLD-RTO: 0 PER 100 WBC
PLATELET # BLD AUTO: 314 K/UL (ref 150–400)
PMV BLD AUTO: 10.4 FL (ref 8.9–12.9)
POTASSIUM SERPL-SCNC: 4.6 MMOL/L (ref 3.5–5.1)
PROT SERPL-MCNC: 7.2 G/DL (ref 6.4–8.2)
RBC # BLD AUTO: 4.5 M/UL (ref 3.8–5.2)
SODIUM SERPL-SCNC: 141 MMOL/L (ref 136–145)
WBC # BLD AUTO: 6.5 K/UL (ref 3.6–11)

## 2023-02-28 DIAGNOSIS — K75.4 AUTOIMMUNE HEPATITIS (HCC): Primary | ICD-10-CM

## 2023-04-22 DIAGNOSIS — K75.4 AUTOIMMUNE HEPATITIS (HCC): Primary | ICD-10-CM

## 2023-04-24 DIAGNOSIS — K75.4 AUTOIMMUNE HEPATITIS (HCC): Primary | ICD-10-CM

## 2023-05-03 LAB
ALBUMIN SERPL-MCNC: 4.7 G/DL (ref 3.8–4.9)
ALP SERPL-CCNC: 52 IU/L (ref 44–121)
ALT SERPL-CCNC: 15 IU/L (ref 0–32)
AST SERPL-CCNC: 15 IU/L (ref 0–40)
BILIRUB DIRECT SERPL-MCNC: 0.11 MG/DL (ref 0–0.4)
BILIRUB SERPL-MCNC: 0.4 MG/DL (ref 0–1.2)
BUN SERPL-MCNC: 12 MG/DL (ref 6–24)
BUN/CREAT SERPL: 15 (ref 9–23)
CALCIUM SERPL-MCNC: 9.5 MG/DL (ref 8.7–10.2)
CHLORIDE SERPL-SCNC: 103 MMOL/L (ref 96–106)
CO2 SERPL-SCNC: 22 MMOL/L (ref 20–29)
CREAT SERPL-MCNC: 0.81 MG/DL (ref 0.57–1)
EGFRCR SERPLBLD CKD-EPI 2021: 88 ML/MIN/1.73
ERYTHROCYTE [DISTWIDTH] IN BLOOD BY AUTOMATED COUNT: 11.7 % (ref 11.7–15.4)
GLUCOSE SERPL-MCNC: 122 MG/DL (ref 70–99)
HCT VFR BLD AUTO: 42 % (ref 34–46.6)
HGB BLD-MCNC: 14.4 G/DL (ref 11.1–15.9)
MCH RBC QN AUTO: 32.7 PG (ref 26.6–33)
MCHC RBC AUTO-ENTMCNC: 34.3 G/DL (ref 31.5–35.7)
MCV RBC AUTO: 96 FL (ref 79–97)
PLATELET # BLD AUTO: 307 X10E3/UL (ref 150–450)
POTASSIUM SERPL-SCNC: 4.7 MMOL/L (ref 3.5–5.2)
PROT SERPL-MCNC: 6.9 G/DL (ref 6–8.5)
RBC # BLD AUTO: 4.4 X10E6/UL (ref 3.77–5.28)
SODIUM SERPL-SCNC: 140 MMOL/L (ref 134–144)
WBC # BLD AUTO: 7.5 X10E3/UL (ref 3.4–10.8)

## 2023-06-01 ENCOUNTER — TELEPHONE (OUTPATIENT)
Age: 53
End: 2023-06-01

## 2023-06-01 RX ORDER — URSODIOL 300 MG/1
CAPSULE ORAL
Qty: 90 CAPSULE | Refills: 0 | OUTPATIENT
Start: 2023-06-01

## 2023-06-01 RX ORDER — URSODIOL 300 MG/1
900 CAPSULE ORAL DAILY
Qty: 270 CAPSULE | Refills: 3 | Status: SHIPPED | OUTPATIENT
Start: 2023-06-01 | End: 2023-06-01 | Stop reason: SDUPTHER

## 2023-06-01 RX ORDER — URSODIOL 300 MG/1
900 CAPSULE ORAL DAILY
Qty: 270 CAPSULE | Refills: 3 | Status: SHIPPED | OUTPATIENT
Start: 2023-06-01

## 2023-08-29 ENCOUNTER — TELEMEDICINE (OUTPATIENT)
Age: 53
End: 2023-08-29
Payer: COMMERCIAL

## 2023-08-29 DIAGNOSIS — K75.4 AUTOIMMUNE HEPATITIS (HCC): Primary | ICD-10-CM

## 2023-08-29 PROCEDURE — 99213 OFFICE O/P EST LOW 20 MIN: CPT | Performed by: PHYSICIAN ASSISTANT

## 2023-08-29 ASSESSMENT — PATIENT HEALTH QUESTIONNAIRE - PHQ9
1. LITTLE INTEREST OR PLEASURE IN DOING THINGS: 0
SUM OF ALL RESPONSES TO PHQ9 QUESTIONS 1 & 2: 0
2. FEELING DOWN, DEPRESSED OR HOPELESS: 0
SUM OF ALL RESPONSES TO PHQ QUESTIONS 1-9: 0

## 2023-08-29 NOTE — PROGRESS NOTES
MD Art, [address], [place]      Krys BREANA Romero S Irais, AGPCNP-BC   Keyona Hawthorne, [facility]   Chavo Jackson, FNP-C  Cookie Motley, FNP-C   Boaz Holder, AGPCNP-BC      [address] [facility], East   at [facility]   [address] [facility], [address] [facility], [address]   [phone]   FAX: [phone]  [facility], [address] [facility], [address]   [phone]   FAX: [phone]     Patient Care Team:  Erma Kern MD as PCP - General  Rakel Mulligan DC as Physician  Samantha Hays MD as Consulting Physician  Samantha Hays MD as Physician    Patient Active Problem List   Diagnosis    Restless leg syndrome    Psoriatic arthritis ([address] [facility])    Primary biliary cholangitis ([facility])    Autoimmune hepatitis ([facility])    S/P laparoscopic cholecystectomy     VIRTUAL TELEHEALTH VISIT PERFORMED DUE TO COVID-19 EPIDEMIC    CONSENT:  Rebecca Bell, who was evaluated through a synchronous (real-time) audio-video encounter, and/or his healthcare decision maker, is aware that it is a billable service, which includes applicable co-pays, with coverage as determined by his insurance carrier. He provided verbal consent to proceed and patient identification was verified. This visit was conducted pursuant to the emergency declaration under the [address] [facility] authority and the [employer] and [employer] Act. A caregiver was present when appropriate. Ability to conduct physical exam was limited. The patient was located at home in a state where the provider was licensed to provide care. Rebecca Bell returns to the [facility] [address] [facility] for monitoring and management of autoimmune hepatitis.  The active problem list, all

## 2023-08-29 NOTE — PROGRESS NOTES
Identified pt with two pt identifiers(name and ). Reviewed record in preparation for visit and have obtained necessary documentation. Chief Complaint   Patient presents with    Other     VV follow up     There were no vitals taken for this visit. 1. \"Have you been to the ER, urgent care clinic since your last visit? Hospitalized since your last visit? \" No    2. \"Have you seen or consulted any other health care providers outside of the [address] [place] [address] since your last visit? \" No     Patient is accompanied by self I have received verbal consent from Jose Lindquist to discuss any/all medical information while they are present in the room.

## 2023-08-30 ENCOUNTER — CLINICAL DOCUMENTATION (OUTPATIENT)
Age: 53
End: 2023-08-30

## 2023-08-31 ENCOUNTER — CLINICAL DOCUMENTATION (OUTPATIENT)
Age: 53
End: 2023-08-31

## 2024-02-29 ENCOUNTER — OFFICE VISIT (OUTPATIENT)
Age: 54
End: 2024-02-29

## 2024-02-29 VITALS
SYSTOLIC BLOOD PRESSURE: 130 MMHG | HEART RATE: 85 BPM | WEIGHT: 167.3 LBS | DIASTOLIC BLOOD PRESSURE: 86 MMHG | TEMPERATURE: 98.1 F | OXYGEN SATURATION: 98 % | BODY MASS INDEX: 26.89 KG/M2 | HEIGHT: 66 IN

## 2024-02-29 DIAGNOSIS — K74.3 PRIMARY BILIARY CHOLANGITIS (HCC): Primary | ICD-10-CM

## 2024-02-29 DIAGNOSIS — K75.4 AUTOIMMUNE HEPATITIS (HCC): ICD-10-CM

## 2024-02-29 ASSESSMENT — PATIENT HEALTH QUESTIONNAIRE - PHQ9
2. FEELING DOWN, DEPRESSED OR HOPELESS: 0
SUM OF ALL RESPONSES TO PHQ QUESTIONS 1-9: 0
SUM OF ALL RESPONSES TO PHQ QUESTIONS 1-9: 0
SUM OF ALL RESPONSES TO PHQ9 QUESTIONS 1 & 2: 0
SUM OF ALL RESPONSES TO PHQ QUESTIONS 1-9: 0
SUM OF ALL RESPONSES TO PHQ QUESTIONS 1-9: 0
1. LITTLE INTEREST OR PLEASURE IN DOING THINGS: 0

## 2024-02-29 NOTE — PROGRESS NOTES
[facility]      Noel Alarcon MD, FACP, FACG, FAASLD      Pham Iglesias, PA-C    Rafia Braxton, [facility]   Shaista Calixt[facility]osefina, [facility]   Emelia Huntley, FNP-C  Greg Rashid P-C   Senia Mehta, [facility]   [address], [address]   [place]  [address] [phone]   FAX: [phone]  [facility]   [address], [address]   [place]  [address] [phone]   FAX: [phone]     Patient Care Team:  Mar Archibald MD as PCP - General  Aurelia Kenyon DC as Physician  Maty Antoine MD as Consulting Physician  Maty Antoine MD as Physician    Patient Active Problem List   Diagnosis    Restless leg syndrome    Psoriatic arthritis (HCC)    Primary biliary cholangitis (HCC)    Autoimmune hepatitis (HCC)    S/P laparoscopic cholecystectomy     Loren Vinson returns to the [facility] for monitoring and management of autoimmune hepatitis. The active problem list, all pertinent past medical history, medications, liver histology, radiologic findings and laboratory findings related to the liver disorder were reviewed with the patient.      A liver biopsy was performed in 4/2012. This demonstrated severe inflammation and bridging fibrosis. Cellcept and prednisone were initiated and improved her liver enzymes. She has been completely weaned off of prednisone in 2012. An MRI and MRCP were performed in 6/2012 and 6/2013 which demonstrated ductal dilatation in the right lobe. MIRA was added to see if this would improve her liver enzymes and she had a dramatically positive response to this medication. At the last office visit, we had elected to eliminate Cellcept as she had been on this for many years and had not had a tapered trial.  She stopped this medication in 2/2023

## 2024-02-29 NOTE — PROGRESS NOTES
Identified pt with two pt identifiers(name and ). Reviewed record in preparation for visit and have obtained necessary documentation.    Chief Complaint   Patient presents with    Other     6month follow up     /86 (Site: Right Upper Arm, Position: Sitting, Cuff Size: Medium Adult)   Pulse 85   Temp 98.1 °F (36.7 °C)   Ht 1.676 m (5' 6\")   Wt 75.9 kg (167 lb 4.8 oz)   SpO2 98%   PF 98 L/min   BMI 27.00 kg/m²       1. \"Have you been to the ER, urgent care clinic since your last visit?  Hospitalized since your last visit?\" No    2. \"Have you seen or consulted any other health care providers outside of the [facility] since your last visit?\" No     Patient is accompanied by self I have received verbal consent from Loren Vinson to discuss any/all medical information while they are present in the room.

## 2024-03-01 LAB
25(OH)D3+25(OH)D2 SERPL-MCNC: 23 NG/ML (ref 30–100)
ALBUMIN SERPL-MCNC: 4.7 G/DL (ref 3.8–4.9)
ALP SERPL-CCNC: 51 IU/L (ref 44–121)
ALT SERPL-CCNC: 25 IU/L (ref 0–32)
AST SERPL-CCNC: 15 IU/L (ref 0–40)
BASOPHILS # BLD AUTO: 0 X10E3/UL (ref 0–0.2)
BASOPHILS NFR BLD AUTO: 0 %
BILIRUB DIRECT SERPL-MCNC: 0.1 MG/DL (ref 0–0.4)
BILIRUB SERPL-MCNC: 0.4 MG/DL (ref 0–1.2)
BUN SERPL-MCNC: 13 MG/DL (ref 6–24)
BUN/CREAT SERPL: 17 (ref 9–23)
CALCIUM SERPL-MCNC: 9.4 MG/DL (ref 8.7–10.2)
CHLORIDE SERPL-SCNC: 102 MMOL/L (ref 96–106)
CO2 SERPL-SCNC: 22 MMOL/L (ref 20–29)
CREAT SERPL-MCNC: 0.76 MG/DL (ref 0.57–1)
EGFRCR SERPLBLD CKD-EPI 2021: 94 ML/MIN/1.73
EOSINOPHIL # BLD AUTO: 0 X10E3/UL (ref 0–0.4)
EOSINOPHIL NFR BLD AUTO: 0 %
ERYTHROCYTE [DISTWIDTH] IN BLOOD BY AUTOMATED COUNT: 11.8 % (ref 11.7–15.4)
GLUCOSE SERPL-MCNC: 92 MG/DL (ref 70–99)
HCT VFR BLD AUTO: 42.1 % (ref 34–46.6)
HGB BLD-MCNC: 14.1 G/DL (ref 11.1–15.9)
IMM GRANULOCYTES # BLD AUTO: 0 X10E3/UL (ref 0–0.1)
IMM GRANULOCYTES NFR BLD AUTO: 0 %
LYMPHOCYTES # BLD AUTO: 2.3 X10E3/UL (ref 0.7–3.1)
LYMPHOCYTES NFR BLD AUTO: 33 %
MCH RBC QN AUTO: 32.3 PG (ref 26.6–33)
MCHC RBC AUTO-ENTMCNC: 33.5 G/DL (ref 31.5–35.7)
MCV RBC AUTO: 96 FL (ref 79–97)
MONOCYTES # BLD AUTO: 0.5 X10E3/UL (ref 0.1–0.9)
MONOCYTES NFR BLD AUTO: 7 %
NEUTROPHILS # BLD AUTO: 4.3 X10E3/UL (ref 1.4–7)
NEUTROPHILS NFR BLD AUTO: 60 %
PLATELET # BLD AUTO: 279 X10E3/UL (ref 150–450)
POTASSIUM SERPL-SCNC: 4.6 MMOL/L (ref 3.5–5.2)
PROT SERPL-MCNC: 7.2 G/DL (ref 6–8.5)
RBC # BLD AUTO: 4.37 X10E6/UL (ref 3.77–5.28)
SODIUM SERPL-SCNC: 140 MMOL/L (ref 134–144)
WBC # BLD AUTO: 7.1 X10E3/UL (ref 3.4–10.8)

## 2024-03-01 RX ORDER — ERGOCALCIFEROL 1.25 MG/1
50000 CAPSULE ORAL WEEKLY
Qty: 12 CAPSULE | Refills: 0 | Status: SHIPPED | OUTPATIENT
Start: 2024-03-01

## 2024-06-11 RX ORDER — URSODIOL 300 MG/1
900 CAPSULE ORAL DAILY
Qty: 270 CAPSULE | Refills: 3 | Status: SHIPPED | OUTPATIENT
Start: 2024-06-11

## 2024-09-04 ENCOUNTER — TELEMEDICINE (OUTPATIENT)
Age: 54
End: 2024-09-04
Payer: COMMERCIAL

## 2024-09-04 DIAGNOSIS — K74.3 PRIMARY BILIARY CHOLANGITIS (HCC): Primary | ICD-10-CM

## 2024-09-04 DIAGNOSIS — E55.9 VITAMIN D DEFICIENCY: ICD-10-CM

## 2024-09-04 PROCEDURE — 99213 OFFICE O/P EST LOW 20 MIN: CPT | Performed by: PHYSICIAN ASSISTANT

## 2024-09-04 ASSESSMENT — PATIENT HEALTH QUESTIONNAIRE - PHQ9
SUM OF ALL RESPONSES TO PHQ9 QUESTIONS 1 & 2: 0
1. LITTLE INTEREST OR PLEASURE IN DOING THINGS: NOT AT ALL
2. FEELING DOWN, DEPRESSED OR HOPELESS: NOT AT ALL
SUM OF ALL RESPONSES TO PHQ QUESTIONS 1-9: 0
DEPRESSION UNABLE TO ASSESS: FUNCTIONAL CAPACITY MOTIVATION LIMITS ACCURACY
SUM OF ALL RESPONSES TO PHQ QUESTIONS 1-9: 0

## 2024-09-04 ASSESSMENT — ANXIETY QUESTIONNAIRES
5. BEING SO RESTLESS THAT IT IS HARD TO SIT STILL: NOT AT ALL
6. BECOMING EASILY ANNOYED OR IRRITABLE: NOT AT ALL
IF YOU CHECKED OFF ANY PROBLEMS ON THIS QUESTIONNAIRE, HOW DIFFICULT HAVE THESE PROBLEMS MADE IT FOR YOU TO DO YOUR WORK, TAKE CARE OF THINGS AT HOME, OR GET ALONG WITH OTHER PEOPLE: NOT DIFFICULT AT ALL
7. FEELING AFRAID AS IF SOMETHING AWFUL MIGHT HAPPEN: NOT AT ALL
3. WORRYING TOO MUCH ABOUT DIFFERENT THINGS: NOT AT ALL
1. FEELING NERVOUS, ANXIOUS, OR ON EDGE: NOT AT ALL
4. TROUBLE RELAXING: NOT AT ALL
GAD7 TOTAL SCORE: 0
2. NOT BEING ABLE TO STOP OR CONTROL WORRYING: NOT AT ALL

## 2024-09-04 NOTE — PROGRESS NOTES
[facility]      Noel Alarcon MD, FACP, FACG, FAASLD      SOPHIA Edouard-GIANNA Braxton, [facility]-BC   Shaista Calixtogmmarianna, [facility]   Emeliaerika Huntley, FNP-C  Greg Rashid FNP-C   Senia Mehta, [facility]   [address], [address]   [place]  [address] [phone]   FAX: [phone]  [facility]   [address], [address]   [place]  [address] [phone]   FAX: [phone]     Patient Care Team:  Mar Archibald MD as PCP - General  Aurelia Kenyon DC as Physician  Maty Antoine MD as Consulting Physician  Maty Antoine MD as Physician    Patient Active Problem List   Diagnosis    Restless leg syndrome    Psoriatic arthritis (HCC)    Primary biliary cholangitis (HCC)    Autoimmune hepatitis (HCC)    S/P laparoscopic cholecystectomy     Loren Vinson, was evaluated through a synchronous (real-time) audio-video encounter. The patient (or guardian if applicable) is aware that this is a billable service, which includes applicable co-pays. This Virtual Visit was conducted with patient's (and/or legal guardian's) consent. Patient identification was verified, and a caregiver was present when appropriate.   The patient was located at Home: [address] [place] [address] [place] [address]  Provider was located at Facility (Appt Dept): [address] [facility]  Rickey [address] [place] [address]  Confirm you are appropriately licensed, registered, or certified to deliver care in the state where the patient is located as indicated above. If you are not or unsure, please re-schedule the visit: Yes, I confirm.      Total time spent for this encounter: Not billed by time    --SOPHIA Edouard on 9/4/2024 at 4:19 PM    An electronic signature was used to authenticate this note.    Loren Vinson

## 2024-09-04 NOTE — PROGRESS NOTES
Chief Complaint   Patient presents with    Follow-up     There were no vitals filed for this visit.  .  \"Have you been to the ER, urgent care clinic since your last visit?  Hospitalized since your last visit?\"    NO    “Have you seen or consulted any other health care providers outside of [facility] since your last visit?”    NO     “Have you had a pap smear?”    NO    No cervical cancer screening on file         “Have you had a colorectal cancer screening such as a colonoscopy/FIT/Cologuard?    YES  Colonoscopy     No colonoscopy on file  No cologuard on file  No FIT/FOBT on file   No flexible sigmoidoscopy on file         Click Here for Release of Records Request

## 2025-04-29 NOTE — PATIENT INSTRUCTIONS
Meclizine 25mg 1/2-1 tablet three times a day as needed for vertigo. flonase 2 sprays each nostril once a day. Cawthorne Exercises for Vertigo: Care Instructions  Your Care Instructions  Simple exercises can help you regain your balance when you have vertigo. If you have Ménière's disease, benign paroxysmal positional vertigo (BPPV), or another inner ear problem, you may have vertigo off and on. Do these exercises first thing in the morning and before you go to bed. You might get dizzy when you first start them. If this happens, try to do them for at least 5 minutes. Do a group of exercises at a time, starting at the top of the list. It may take several weeks before you can do all the exercises without feeling dizzy. Follow-up care is a key part of your treatment and safety. Be sure to make and go to all appointments, and call your doctor if you are having problems. It's also a good idea to know your test results and keep a list of the medicines you take. How can you care for yourself at home? Exercise 1  While sitting on the side of the bed and holding your head still:  · Look up as far as you can. · Look down as far as you can. · Look from side to side as far as you can. · Stretch your arm straight out in front of you. Focus on your index finger. Continue to focus on your finger while you bring it to your nose. Exercise 2  While sitting on the side of the bed:  · Bring your head as far back as you can. · Bring your head forward to touch your chin to your chest.  · Turn your head from side to side. · Do these exercises first with your eyes open. Then try with your eyes closed. Exercise 3  While sitting on the side of the bed:  · Shrug your shoulders straight upward, then relax them. · Bend over and try to touch the ground with your fingers. Then go back to a sitting position. · Toss a small ball from one hand to the other. Throw the ball higher than your eyes so you have to look up.   Exercise 4  While standing (with someone close by if you feel uncomfortable):  · Repeat Exercise 1.  · Repeat Exercise 2.  · Pass a ball between your legs and above your head. · Sit down and then stand up. Repeat. Turn around in a [place] a different way each time you stand. · With someone close by to help you, try the above exercises with your eyes closed. Exercise 5  In a room that is cleared of obstacles:  · Walk to a corner of the room, turn to your right, and walk back to the starting point. Now, repeat and turn left. · Walk up and down a slope. Now try stairs. · While holding on to someone's arm, try these exercises with your eyes closed. When should you call for help? Watch closely for changes in your health, and be sure to contact your doctor if:  · You do not get better as expected. Where can you learn more? Go to http://tiffanie-amara.info/. Enter S139 in the search box to learn more about \"Cawthorne Exercises for Vertigo: Care Instructions. \"  Current as of: October 19, 2016  Content Version: 11.3  © 3441-8217 [employer]. Care instructions adapted under license by [employer] (which disclaims liability or warranty for this information). If you have questions about a medical condition or this instruction, always ask your healthcare professional. [address] any warranty or liability for your use of this information. no

## 2025-06-04 RX ORDER — URSODIOL 300 MG/1
900 CAPSULE ORAL DAILY
Qty: 270 CAPSULE | Refills: 3 | Status: SHIPPED | OUTPATIENT
Start: 2025-06-04